# Patient Record
Sex: MALE | Race: WHITE | NOT HISPANIC OR LATINO | Employment: FULL TIME | URBAN - METROPOLITAN AREA
[De-identification: names, ages, dates, MRNs, and addresses within clinical notes are randomized per-mention and may not be internally consistent; named-entity substitution may affect disease eponyms.]

---
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
## 2020-01-02 ENCOUNTER — OFFICE VISIT (OUTPATIENT)
Dept: UROLOGY | Facility: CLINIC | Age: 20
End: 2020-01-02
Payer: COMMERCIAL

## 2020-01-02 VITALS
HEIGHT: 69 IN | DIASTOLIC BLOOD PRESSURE: 61 MMHG | BODY MASS INDEX: 26.96 KG/M2 | WEIGHT: 182 LBS | SYSTOLIC BLOOD PRESSURE: 109 MMHG | HEART RATE: 59 BPM

## 2020-01-02 DIAGNOSIS — N20.0 NEPHROLITHIASIS: Primary | ICD-10-CM

## 2020-01-02 PROCEDURE — 3008F BODY MASS INDEX DOCD: CPT | Mod: CPTII,S$GLB,, | Performed by: UROLOGY

## 2020-01-02 PROCEDURE — 99999 PR PBB SHADOW E&M-EST. PATIENT-LVL III: ICD-10-PCS | Mod: PBBFAC,,, | Performed by: UROLOGY

## 2020-01-02 PROCEDURE — 3008F PR BODY MASS INDEX (BMI) DOCUMENTED: ICD-10-PCS | Mod: CPTII,S$GLB,, | Performed by: UROLOGY

## 2020-01-02 PROCEDURE — 99999 PR PBB SHADOW E&M-EST. PATIENT-LVL III: CPT | Mod: PBBFAC,,, | Performed by: UROLOGY

## 2020-01-02 PROCEDURE — 99215 PR OFFICE/OUTPT VISIT, EST, LEVL V, 40-54 MIN: ICD-10-PCS | Mod: S$GLB,,, | Performed by: UROLOGY

## 2020-01-02 PROCEDURE — 99215 OFFICE O/P EST HI 40 MIN: CPT | Mod: S$GLB,,, | Performed by: UROLOGY

## 2020-01-02 RX ORDER — KETOROLAC TROMETHAMINE 10 MG/1
10 TABLET, FILM COATED ORAL EVERY 8 HOURS PRN
Qty: 10 TABLET | Refills: 0 | Status: SHIPPED | OUTPATIENT
Start: 2020-01-02 | End: 2020-01-07

## 2020-01-02 NOTE — PROGRESS NOTES
[facility] Urology H&P Note - [place]   Staff: MD Milena  Group:Milena/Richard/Junito/William  Referring provider and please cc:    PCP: Rajni Hickey MD        Subjective:      HPI: Juan Jose Wiseman is a 19 y.o. male      Right uvj stone s/p ureteroscopy with hypercalciuria and hyperparahtyroidism  -He went to ER on 5/18/18 for right flank pain 10/10 that had been present for 3-4 days. +nausea and vomiting. No fever. +frequency and urgency. No dysuria and decreased UOP (likely from dehydration). CTAP with contrast showed a 7mm right distal ureteral stone with proximal hydronephrosis. No other stones seen on ct scan.  WBC was 12.7, cr was normal. ALT 49, Calcium 11.3. Ua showed blood and protein, hyaline casts. He was sent home with norco, zofran and flomax.  failed trial of passge  -6/13/18 underwent right urs and stone extraction with stent on strings. Removed stent a few days later without any problems. This was his only and firs stone. Stones were 60% ca phosphate/40%ca phosphate apatite   -I had him see  in [place] for persistent elevated pth (270s then 220ss) and elevated calcium 11.9. He underwent a parathyroidectomy in 12/7/18. F/u pth was normal.   -last seen by me on 7/27/19 and this was his first and only stone. Plan was for f/u prn. He called 9/5 with c/o GH and difficulty urinating. I recommended a kub,rbus and f/u. They did not make f/u due to work and school. Came to er 9/11/19 with c/o nausea/vomiting/hematuria/pain and chills. ctrss in er showed a RUP 6mm stone, R UVJ 8mm stone and bilateral smaller obstructing stones. Underwent extraction of distal right stone and stent placed   -returned on 9/25/19 for extraction of remaining R renal stones (3 stones, RUP 7mm, RLP 3mm, RMP 2mm) they had moved to ureter. Some randalls plaques seen. Removed stent at home.    Interval history:   Called c/o flan pain around 11/12/19. Started him on flomax and toradol. kub and rbus on  "11/13/19 showed no hydro and possible 6mm stone but had also a known 1mm stone in L kidney. ua was negative for blood. I asked him to stay on flomax and toradol, stayed on flomax.  He then went to health food store and picked up "stone free" and took 5x a day and passed about 3 small stone fragments (saw them in toilet) on 11/17/19. Since then no flank pain.     Was supposed to do 24 hour urine but hasn't had time      ua void: could not provide  ua history/ucx:  11/13/19          No cx, void: neg  9/18/19 Ng, void: 3+bld/tr leuk, >100 rbc (stent in)  9/11/19 Ng, orange,  25 rbc/4 wbc   6/12/18            Ng, void: tr blood  6/6/18              Ng, void: tr bld/trketones  5/25/18            Ng, void: tr leuk and blood, cath: 2+blood  5/18/18            No cx, void: large bloodNo family hx of stones.      REVIEW OF SYSTEMS:  General ROS: no fevers, no chills  Psychological ROS: no depression  Endocrine ROS: no heat or cold  Respiratory ROS: no SOB  Cardiovascular ROS: no CP  Gastrointestinal ROS: no abdominal pain, no constipation, no diarrhea, noBRBPR  Musculoskeletal ROS: no muscle pain  Neurological ROS: no headaches  Dermatological ROS: no rashes  HEENT: nonglasses, no sinus   ROS: per HPI     PMHx:  Past Medical History:   Diagnosis Date    Hyperparathyroidism           PSHx:  Past Surgical History:   Procedure Laterality Date    CYSTOSCOPY WITH CALCULUS EXTRACTION Right 9/25/2019    Procedure: CYSTOSCOPY, WITH CALCULUS REMOVAL;  Surgeon: Kayla Abbott MD;  Location: [facility] OR;  Service: Urology;  Laterality: Right;    CYSTOSCOPY WITH URETEROSCOPY, RETROGRADE PYELOGRAPHY, AND INSERTION OF STENT Right 6/13/2018    Procedure: CYSTOSCOPY, WITH RETROGRADE PYELOGRAM AND URETERAL STENT INSERTION;  Surgeon: Kayla Abbott MD;  Location: [facility] OR;  Service: Urology;  Laterality: Right;    CYSTOURETEROSCOPY WITH RETROGRADE PYELOGRAPHY AND INSERTION OF STENT INTO URETER Right 9/11/2019    " Procedure: CYSTOURETEROSCOPY, WITH RETROGRADE PYELOGRAM AND URETERAL STENT INSERTION;  Surgeon: Kayla Abbott MD;  Location: [facility] OR;  Service: Urology;  Laterality: Right;    CYSTOURETEROSCOPY WITH RETROGRADE PYELOGRAPHY AND INSERTION OF STENT INTO URETER Right 9/25/2019    Procedure: CYSTOURETEROSCOPY, WITH RETROGRADE PYELOGRAM AND URETERAL STENT INSERTION [2531];  Surgeon: Kayla Abbott MD;  Location: [facility] OR;  Service: Urology;  Laterality: Right;  flexible urs    head stiches  2009    2006 left pinky stiches    LASER LITHOTRIPSY Right 6/13/2018    Procedure: LITHOTRIPSY, USING LASER;  Surgeon: Kayla Abbott MD;  Location: [facility] OR;  Service: Urology;  Laterality: Right;    LASER LITHOTRIPSY Right 9/11/2019    Procedure: LITHOTRIPSY, USING LASER;  Surgeon: Kayla Abbott MD;  Location: [facility] OR;  Service: Urology;  Laterality: Right;    PARATHYROIDECTOMY N/A 12/7/2018    Procedure: PARATHYROIDECTOMY;  Surgeon: Lakeisha Patricio MD;  Location: [facility];  Service: General;  Laterality: N/A;  NIMS & Intraop PTH monitoring     TONSILLECTOMY      TYMPANOSTOMY TUBE PLACEMENT      URETEROSCOPIC REMOVAL OF URETERIC CALCULUS Right 6/13/2018    Procedure: REMOVAL, CALCULUS, URETER, URETEROSCOPIC;  Surgeon: Kayla Abbott MD;  Location: [facility] OR;  Service: Urology;  Laterality: Right;    URETEROSCOPIC REMOVAL OF URETERIC CALCULUS  9/11/2019    Procedure: REMOVAL, CALCULUS, URETER, URETEROSCOPIC;  Surgeon: Kayla Abbott MD;  Location: [facility];  Service: Urology;;       Stents/Valves/Foreign Bodies: No  Cardiac Evaluation: No     Screening Studies  Colonoscopy: no     Fam Hx:   malignancies: No  . Gyn malignancies: no.   kidney stones: No      Soc Hx:  + tobacco.  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     Allergies:  Shellfish containing products and Iodine and iodide containing products        Anticoagulation/Aspirin: none  Objective:      Vitals:    01/02/20 1433   BP: 109/61   Pulse: (!) 59          General:WDWN in NAD  Eyes: PERRLA, normal conjunctiva  Respiratory: no increased work on breathing. No wheezing.   Cardiovascular: No obvious extremity edema. Warm and well perfused.   GI: no palpation of masses. No tenderness. No hepatosplenomegaly to palpation.  Musculoskeletal: normal range of motion of bilateral upper extremities. Normal muscle strength and tone.  Skin: no obvious rashes or lesions. No tightening of skin noted.  Neurologic: CN grossly normal. Normal sensation.   Psychiatric: awake, alert and oriented x 3. Mood and affect normal. Cooperative.      exam   deferred     LABS REVIEW:  Recent Labs   Lab 06/12/19  1155 09/11/19  1353 09/12/19  0352   WBC 6.86 12.75 H 9.08   Hemoglobin 14.1 14.3 12.6 L   Hematocrit 42.6 43.3 38.8 L   Platelets 250 204 191   ]  Recent Labs   Lab 07/16/18  0852 10/11/18  1428 12/12/18  1036 06/12/19  1155 09/11/19  1353 09/12/19  0352   Sodium 138 140 138 139 138 139   Potassium 4.5 4.1 4.3 4.1 3.9 4.1   Chloride 106 104 104 105 104 106   CO2 26 27 25 22 L 25 25   BUN, Bld 9 8 11 14 9 7   Creatinine 0.8 0.7 0.8 0.8 0.8 0.7   Glucose 96 115 H 89 81 98 118 H   Calcium 11.9 H 12.2 HH 10.1 10.1 9.6 9.0   Phosphorus 2.7 2.0 L 2.6 L  --   --   --    Alkaline Phosphatase 124  --   --  67 59  --    Total Protein 7.4  --   --  7.6 7.7  --    Albumin 4.5 4.3 4.4 4.5 4.8  --    Total Bilirubin 0.8  --   --  0.4 0.6  --    AST 16  --   --  17 17  --    ALT 26  --   --  21 26  --    ]    No results found for: LABA1C, HGBA1C          Pertinent urologic PATHOLOGY REVIEW:  Stones were 60% ca phosphate/40%ca phosphate apatite      Pertinent Urologic RADIOGRAPHIC REVIEW:  rbus 11/13/19  Right kidney: The right kidney measures 10.2 cm. No cortical thinning. No loss of  corticomedullary distinction. Resistive index measures 0.62.  No mass. A couple 6 mm echogenic foci suggesting stones no hydronephrosis.    Left kidney: The left kidney measures 10.7 cm. No cortical thinning. No loss of corticomedullary distinction. Resistive index measures 0.50.  No mass. No renal stone. No hydronephrosis.    The bladder is image but the bladder nearly completely empty at the time of the exam.  Bladder wall does appear diffusely thickened questioning cystitis but the appearance is accentuated and can be due to the incomplete degree of distention.  Ureteral jets not visualized    kub 11/12/19  There is a suspected 2 mm stone of the lower pole of the right kidney.  No stones are seen overlying the left renal shadow or along the courses of the ureters    ctrss 9/11/19  Lung bases clear.  Normal size heart.  Nondistended gallbladder.  There are several stones in the right renal collecting system, at least 3 in number with largest measuring 7 mm.  1 mm stone in the left renal collecting system.  There is hydroureteronephrosis on the right with a 7 mm stone near the UVJ.  Remaining solid abdominal organs grossly unremarkable on this noncontrast exam.  There is no enteric contrast which limits bowel assessment.  No dilated loops of bowel.  Normal appendix.  Unremarkable noncontrast prostate.  Several prominent but nonenlarged mesenteric and retroperitoneal lymph nodes.  No acute osseous abnormality.     rbus 6/11/18  No hydro and possible right renal stone     kub 6/11/18  Nonspecific 5 mm calcification right hemipelvis, for which a distal right ureteral stone cannot be excluded     ctap w 5/18/18 smh  7mm distal stone with proximal hydro   No other stones        Assessment:      Nephrolithiasis         Plan:        Still  May have R renal stones although I had cleared him based on xray and rbus  Had hyperparathyroidism s/p parathyroidectomy in 12/18  Hasn't had repeat labs since except for Ca 9/2019 and  "that was 6.0  Recommend 24 hour urine + repeating a pTH    Keep flomax and toradol on hand and take if passing stone  toradol rewritten and sent to [facility]  Cannot recommend for or against "stone free" supplement     Repeating to make sure not the cause of recurrent stones, should be much less prone to make stones now that he had parahtyroids removed.   rbus and kub in 6 months and f/u to discuss images and 24 hour.   24 hour urine + labs (vit D, pth, bmp)       Kayla Abbott MD   "

## 2020-01-02 NOTE — PATIENT INSTRUCTIONS
"  Still  May have R renal stones although I had cleared him based on xray and rbus  Had hyperparathyroidism s/p parathyroidectomy in 12/18  Hasn't had repeat labs since except for Ca 9/2019 and that was 6.0  Recommend 24 hour urine + repeating a pTH    Keep flomax and toradol on hand and take if passing stone  toradol rewritten and sent to [facility]  Cannot recommend for or against "stone free" supplement     Repeating to make sure not the cause of recurrent stones, should be much less prone to make stones now that he had parahtyroids removed.   rbus and kub in 6 months and f/u to discuss images and 24 hour.   24 hour urine + labs (vit D, pth, bmp)    "

## 2020-03-25 ENCOUNTER — OFFICE VISIT (OUTPATIENT)
Dept: FAMILY MEDICINE | Facility: CLINIC | Age: 20
End: 2020-03-25
Payer: COMMERCIAL

## 2020-03-25 ENCOUNTER — TELEPHONE (OUTPATIENT)
Dept: FAMILY MEDICINE | Facility: CLINIC | Age: 20
End: 2020-03-25

## 2020-03-25 DIAGNOSIS — R05.9 COUGH: ICD-10-CM

## 2020-03-25 DIAGNOSIS — H92.09 EAR ACHE: Primary | ICD-10-CM

## 2020-03-25 DIAGNOSIS — R09.81 CONGESTION OF PARANASAL SINUS: ICD-10-CM

## 2020-03-25 PROCEDURE — 99213 PR OFFICE/OUTPT VISIT, EST, LEVL III, 20-29 MIN: ICD-10-PCS | Mod: 95,,, | Performed by: NURSE PRACTITIONER

## 2020-03-25 PROCEDURE — 99213 OFFICE O/P EST LOW 20 MIN: CPT | Mod: 95,,, | Performed by: NURSE PRACTITIONER

## 2020-03-25 NOTE — TELEPHONE ENCOUNTER
----- Message from Keyonna Bosch sent at 3/25/2020  4:33 PM CDT -----  Contact: Mother Kayla [phone]  Patient;s mother called and asked for a prescription Claritin D or Sudified to be sent into [facility] [address]   Call back [phone]

## 2020-03-25 NOTE — PATIENT INSTRUCTIONS
Continue tylenol and ibuprofen. Stop theraflu  Take claritin d 12 hour twice a day for 5-7 days.  Mucinex DM or Delsym(Dextromethorphan) for cough.  Sudafed if claritin d not available.  Call Friday with update if needed. OOW until Monday. Quarantine until Monday unless fever or still coughing.

## 2020-03-25 NOTE — PROGRESS NOTES
This dictation has been generated using Modal Fluency Dictation some phonetic errors may occur. Please contact author for clarification if needed.     Problem List Items Addressed This Visit     None      Visit Diagnoses     Ear ache    -  Primary    Cough        Congestion of paranasal sinus            Patient Instructions   Continue tylenol and ibuprofen. Stop theraflu  Take claritin d 12 hour twice a day for 5-7 days.  Mucinex DM or Delsym(Dextromethorphan) for cough.  Sudafed if claritin d not available.  Call Friday with update if needed. OOW until Monday. Quarantine until Monday unless fever or still coughing.       Tory teen until Monday.  If afebrile for 72 hr may break quarantine.  Recommended over-the-counter therapies as above.  Suspect eustachian tube dysfunction.  Though strep exposure patient does not have classic symptoms and throat is unremarkable on image.    Follow up if symptoms worsen or fail to improve.    ________________________________________________________________  ________________________________________________________________      Chief Complaint   Patient presents with    Sore Throat     History of present illness  This 20 y.o. presents today for complaint of sore throat which is improved.  He notes cough and congestion.  He has had earache bilateral but worse on the right.  He does have a small child who tested positive for strep however patient notes cough and productive green sputum.  He does not currently have any gland swelling.  No fever or chills.  He tried TheraFlu for his symptoms without resolution.  No known covid contact.   Review of systems  Patient does no body aches.  No malaise.  No sinus pain.  No drainage from the eyes.  Cough productive of sputum.  Patient does note shortness of breath.  No chest pain.  Patient is short of breath with coughing.  Allergies iodine  Surgery history tonsil and adenoids.  Had tubes in his ears all around the age of 4 years of  age        Past Medical History:   Diagnosis Date    Hyperparathyroidism        Past Surgical History:   Procedure Laterality Date    CYSTOSCOPY WITH CALCULUS EXTRACTION Right 9/25/2019    Procedure: CYSTOSCOPY, WITH CALCULUS REMOVAL;  Surgeon: Kayla Abbott MD;  Location: [facility];  Service: Urology;  Laterality: Right;    CYSTOSCOPY WITH URETEROSCOPY, RETROGRADE PYELOGRAPHY, AND INSERTION OF STENT Right 6/13/2018    Procedure: CYSTOSCOPY, WITH RETROGRADE PYELOGRAM AND URETERAL STENT INSERTION;  Surgeon: Kayla Abbott MD;  Location: [facility];  Service: Urology;  Laterality: Right;    CYSTOURETEROSCOPY WITH RETROGRADE PYELOGRAPHY AND INSERTION OF STENT INTO URETER Right 9/11/2019    Procedure: CYSTOURETEROSCOPY, WITH RETROGRADE PYELOGRAM AND URETERAL STENT INSERTION;  Surgeon: Kayla Abbott MD;  Location: [facility];  Service: Urology;  Laterality: Right;    CYSTOURETEROSCOPY WITH RETROGRADE PYELOGRAPHY AND INSERTION OF STENT INTO URETER Right 9/25/2019    Procedure: CYSTOURETEROSCOPY, WITH RETROGRADE PYELOGRAM AND URETERAL STENT INSERTION [2531];  Surgeon: Kayla Abbott MD;  Location: [facility];  Service: Urology;  Laterality: Right;  flexible urs    head stiches  2009 2006 left pinky stiches    LASER LITHOTRIPSY Right 6/13/2018    Procedure: LITHOTRIPSY, USING LASER;  Surgeon: Kayla Abbott MD;  Location: [facility];  Service: Urology;  Laterality: Right;    LASER LITHOTRIPSY Right 9/11/2019    Procedure: LITHOTRIPSY, USING LASER;  Surgeon: Kayla Abbott MD;  Location: [facility];  Service: Urology;  Laterality: Right;    PARATHYROIDECTOMY N/A 12/7/2018    Procedure: PARATHYROIDECTOMY;  Surgeon: Lakeisha Patricio MD;  Location: [facility];  Service: General;  Laterality: N/A;  NIMS & Intraop PTH monitoring     TONSILLECTOMY      TYMPANOSTOMY TUBE PLACEMENT      URETEROSCOPIC REMOVAL OF URETERIC CALCULUS Right 6/13/2018    Procedure: REMOVAL,  CALCULUS, URETER, URETEROSCOPIC;  Surgeon: Kayla Abbott MD;  Location: [facility] OR;  Service: Urology;  Laterality: Right;    URETEROSCOPIC REMOVAL OF URETERIC CALCULUS  9/11/2019    Procedure: REMOVAL, CALCULUS, URETER, URETEROSCOPIC;  Surgeon: Kayla Abbott MD;  Location: [facility] OR;  Service: Urology;;       Family History   Problem Relation Age of Onset    Stroke Maternal Aunt     Heart disease Maternal Grandmother     Diabetes Maternal Grandmother     Hypertension Maternal Grandmother     Cancer Maternal Grandmother     Lupus Maternal Grandfather     Heart disease Maternal Grandfather     Hypertension Maternal Grandfather     Heart disease Paternal Grandmother     Hypertension Paternal Grandmother     Cancer Paternal Grandmother     Heart disease Paternal Grandfather     Hypertension Paternal Grandfather     Cancer Paternal Grandfather     Psoriasis Neg Hx     Eczema Neg Hx        Social History     Socioeconomic History    Marital status: Single     Spouse name: Not on file    Number of children: Not on file    Years of education: Not on file    Highest education level: Not on file   Occupational History    Not on file   Social Needs    Financial resource strain: Not on file    Food insecurity:     Worry: Not on file     Inability: Not on file    Transportation needs:     Medical: No     Non-medical: No   Tobacco Use    Smoking status: Current Every Day Smoker     Packs/day: 0.50     Types: Cigarettes    Smokeless tobacco: Never Used   Substance and Sexual Activity    Alcohol use: No     Frequency: 2-4 times a month     Drinks per session: 1 or 2     Binge frequency: Never    Drug use: No    Sexual activity: Not on file   Lifestyle    Physical activity:     Days per week: 5 days     Minutes per session: 60 min    Stress: Not at all   Relationships    Social connections:     Talks on phone: More than three times a week     Gets together: More than three times a  week     Attends Christianity service: Not on file     Active member of club or organization: No     Attends meetings of clubs or organizations: Never     Relationship status: Never    Other Topics Concern    Not on file   Social History Narrative    [employer] student studying to be a . Graduates May 2020.  His girlfriend is pregnant.       Current Outpatient Medications   Medication Sig Dispense Refill    calcium carbonate 400 mg calcium (1,000 mg) Chew Take 1000mg elemental calcium twice daily. (Patient not taking: Reported on 1/2/2020)  0    cholecalciferol, vitamin D3, (VITAMIN D3) 1,000 unit capsule Take 2 capsules (2,000 Units total) by mouth once daily. (Patient not taking: Reported on 1/2/2020)  0    ondansetron (ZOFRAN) 4 MG tablet Take 1 tablet (4 mg total) by mouth every 6 (six) hours as needed for Nausea. 30 tablet 0    pediatric multivitamin chewable tablet Take 1 tablet by mouth once daily.      senna-docusate 8.6-50 mg (PERICOLACE) 8.6-50 mg per tablet Take 1 tablet by mouth 2 (two) times daily. (Patient not taking: Reported on 1/2/2020) 10 tablet 0    tamsulosin (FLOMAX) 0.4 mg Cap Take 1 capsule (0.4 mg total) by mouth after dinner. 30 capsule 0     No current facility-administered medications for this visit.      Facility-Administered Medications Ordered in Other Visits   Medication Dose Route Frequency Provider Last Rate Last Dose    cefTRIAXone (ROCEPHIN) 2 g in dextrose 5 % 50 mL IVPB  2 g Intravenous On Call Procedure Kayla Abbott MD   2 g at 09/25/19 0735       Review of patient's allergies indicates:   Allergen Reactions    Shellfish containing products Nausea And Vomiting    Iodine and iodide containing products        Physical examination  Vitals Reviewed  Gen. Well-dressed well-nourished patient looks sick not septic  Skin warm dry and intact.  No rashes noted.  HEENT.    Pharynx is unremarkable.  Image noted on phone.  No maxillary or frontal sinus tenderness  when percussed.    Neck is supple without adenopathy  Chest.  Respirations are even unlabored.   Neuro. Awake alert oriented x4.  Normal judgment and cognition noted.  Extremities no clubbing cyanosis or edema noted.     Call or return to clinic prn if these symptoms worsen or fail to improve as anticipated.

## 2020-03-26 ENCOUNTER — PATIENT MESSAGE (OUTPATIENT)
Dept: FAMILY MEDICINE | Facility: CLINIC | Age: 20
End: 2020-03-26

## 2020-03-26 DIAGNOSIS — R09.81 SINUS CONGESTION: Primary | ICD-10-CM

## 2020-06-12 ENCOUNTER — OFFICE VISIT (OUTPATIENT)
Dept: FAMILY MEDICINE | Facility: CLINIC | Age: 20
End: 2020-06-12
Payer: COMMERCIAL

## 2020-06-12 VITALS
BODY MASS INDEX: 26.35 KG/M2 | HEART RATE: 68 BPM | HEIGHT: 69 IN | OXYGEN SATURATION: 97 % | DIASTOLIC BLOOD PRESSURE: 62 MMHG | TEMPERATURE: 98 F | WEIGHT: 177.94 LBS | SYSTOLIC BLOOD PRESSURE: 104 MMHG

## 2020-06-12 DIAGNOSIS — Z13.6 ENCOUNTER FOR LIPID SCREENING FOR CARDIOVASCULAR DISEASE: ICD-10-CM

## 2020-06-12 DIAGNOSIS — Z00.00 WELLNESS EXAMINATION: Primary | ICD-10-CM

## 2020-06-12 DIAGNOSIS — Z86.39 HISTORY OF PARATHYROID DISEASE: ICD-10-CM

## 2020-06-12 DIAGNOSIS — Z13.220 ENCOUNTER FOR LIPID SCREENING FOR CARDIOVASCULAR DISEASE: ICD-10-CM

## 2020-06-12 PROCEDURE — 99999 PR PBB SHADOW E&M-EST. PATIENT-LVL IV: ICD-10-PCS | Mod: PBBFAC,,, | Performed by: FAMILY MEDICINE

## 2020-06-12 PROCEDURE — 99395 PR PREVENTIVE VISIT,EST,18-39: ICD-10-PCS | Mod: S$GLB,,, | Performed by: FAMILY MEDICINE

## 2020-06-12 PROCEDURE — 99999 PR PBB SHADOW E&M-EST. PATIENT-LVL IV: CPT | Mod: PBBFAC,,, | Performed by: FAMILY MEDICINE

## 2020-06-12 PROCEDURE — 99395 PREV VISIT EST AGE 18-39: CPT | Mod: S$GLB,,, | Performed by: FAMILY MEDICINE

## 2020-06-12 NOTE — PROGRESS NOTES
Subjective:       Patient ID: Juan Jose Wiseman is a 20 y.o. male.    Chief Complaint: Annual Exam    HPI     Mr. Wiseman presents to clinic for wellness.     History of hyperparathyroidism. Symptoms have resolved. Patient states he was signed off by endocrinology.    Seeing Dr. Abbott for kidney stones.     Diet: Eat what he wants    Exercsie: Does a lot of yard work.       Past Medical History:   Diagnosis Date    Hyperparathyroidism        Past Surgical History:   Procedure Laterality Date    CYSTOSCOPY WITH CALCULUS EXTRACTION Right 9/25/2019    Procedure: CYSTOSCOPY, WITH CALCULUS REMOVAL;  Surgeon: Kayla Abbott MD;  Location: [facility];  Service: Urology;  Laterality: Right;    CYSTOSCOPY WITH URETEROSCOPY, RETROGRADE PYELOGRAPHY, AND INSERTION OF STENT Right 6/13/2018    Procedure: CYSTOSCOPY, WITH RETROGRADE PYELOGRAM AND URETERAL STENT INSERTION;  Surgeon: Kayla Abbott MD;  Location: [facility];  Service: Urology;  Laterality: Right;    CYSTOURETEROSCOPY WITH RETROGRADE PYELOGRAPHY AND INSERTION OF STENT INTO URETER Right 9/11/2019    Procedure: CYSTOURETEROSCOPY, WITH RETROGRADE PYELOGRAM AND URETERAL STENT INSERTION;  Surgeon: Kayla Abbott MD;  Location: [facility] OR;  Service: Urology;  Laterality: Right;    CYSTOURETEROSCOPY WITH RETROGRADE PYELOGRAPHY AND INSERTION OF STENT INTO URETER Right 9/25/2019    Procedure: CYSTOURETEROSCOPY, WITH RETROGRADE PYELOGRAM AND URETERAL STENT INSERTION [2531];  Surgeon: Kayla Abbott MD;  Location: [facility] OR;  Service: Urology;  Laterality: Right;  flexible urs    head stiches  2009 2006 left pinky stiches    LASER LITHOTRIPSY Right 6/13/2018    Procedure: LITHOTRIPSY, USING LASER;  Surgeon: Kayla Abbott MD;  Location: [facility] OR;  Service: Urology;  Laterality: Right;    LASER LITHOTRIPSY Right 9/11/2019    Procedure: LITHOTRIPSY, USING LASER;  Surgeon: Kayla Abbott MD;  Location: [facility] OR;  Service:  Urology;  Laterality: Right;    PARATHYROIDECTOMY N/A 12/7/2018    Procedure: PARATHYROIDECTOMY;  Surgeon: Lakeisha Patricio MD;  Location: [facility];  Service: General;  Laterality: N/A;  NIMS & Intraop PTH monitoring     TONSILLECTOMY      TYMPANOSTOMY TUBE PLACEMENT      URETEROSCOPIC REMOVAL OF URETERIC CALCULUS Right 6/13/2018    Procedure: REMOVAL, CALCULUS, URETER, URETEROSCOPIC;  Surgeon: Kayla Abbott MD;  Location: [facility] OR;  Service: Urology;  Laterality: Right;    URETEROSCOPIC REMOVAL OF URETERIC CALCULUS  9/11/2019    Procedure: REMOVAL, CALCULUS, URETER, URETEROSCOPIC;  Surgeon: Kayla Abbott MD;  Location: [facility] OR;  Service: Urology;;       Family History   Problem Relation Age of Onset    Stroke Maternal Aunt     Heart disease Maternal Grandmother     Diabetes Maternal Grandmother     Hypertension Maternal Grandmother     Cancer Maternal Grandmother     Lupus Maternal Grandfather     Heart disease Maternal Grandfather     Hypertension Maternal Grandfather     Heart disease Paternal Grandmother     Hypertension Paternal Grandmother     Cancer Paternal Grandmother     Heart disease Paternal Grandfather     Hypertension Paternal Grandfather     Cancer Paternal Grandfather     Psoriasis Neg Hx     Eczema Neg Hx        Social History     Tobacco Use    Smoking status: Current Every Day Smoker     Packs/day: 0.50     Types: Cigarettes    Smokeless tobacco: Never Used   Substance Use Topics    Alcohol use: No     Frequency: 2-4 times a month     Drinks per session: 1 or 2     Binge frequency: Never    Drug use: No       Social History     Substance and Sexual Activity   Sexual Activity Not on file          Current Outpatient Medications:     calcium carbonate 400 mg calcium (1,000 mg) Chew, Take 1000mg elemental calcium twice daily. (Patient not taking: Reported on 6/12/2020), Disp: , Rfl: 0    cholecalciferol, vitamin D3, (VITAMIN D3) 1,000 unit capsule,  Take 2 capsules (2,000 Units total) by mouth once daily. (Patient not taking: Reported on 1/2/2020), Disp: , Rfl: 0    ondansetron (ZOFRAN) 4 MG tablet, Take 1 tablet (4 mg total) by mouth every 6 (six) hours as needed for Nausea., Disp: 30 tablet, Rfl: 0    pediatric multivitamin chewable tablet, Take 1 tablet by mouth once daily., Disp: , Rfl:     senna-docusate 8.6-50 mg (PERICOLACE) 8.6-50 mg per tablet, Take 1 tablet by mouth 2 (two) times daily. (Patient not taking: Reported on 1/2/2020), Disp: 10 tablet, Rfl: 0    tamsulosin (FLOMAX) 0.4 mg Cap, Take 1 capsule (0.4 mg total) by mouth after dinner., Disp: 30 capsule, Rfl: 0  No current facility-administered medications for this visit.     Facility-Administered Medications Ordered in Other Visits:     cefTRIAXone (ROCEPHIN) 2 g in dextrose 5 % 50 mL IVPB, 2 g, Intravenous, On Call Procedure, Kayla Abbott MD, 2 g at 09/25/19 0735     Review of patient's allergies indicates:   Allergen Reactions    Shellfish containing products Nausea And Vomiting    Iodine and iodide containing products         Single    Review of Systems   Constitutional: Negative for activity change and unexpected weight change.   HENT: Negative for hearing loss, rhinorrhea and trouble swallowing.    Eyes: Negative for discharge and visual disturbance.   Respiratory: Negative for chest tightness and wheezing.    Cardiovascular: Negative for chest pain and palpitations.   Gastrointestinal: Negative for blood in stool, constipation, diarrhea and vomiting.   Endocrine: Negative for polydipsia and polyuria.   Genitourinary: Negative for difficulty urinating, hematuria and urgency.   Musculoskeletal: Negative for arthralgias, joint swelling and neck pain.   Neurological: Negative for weakness and headaches.   Psychiatric/Behavioral: Negative for confusion and dysphoric mood.           Objective:          Vitals:    06/12/20 0907   BP: 104/62   Pulse: 68   Temp: 97.6 °F (36.4 °C)  "  SpO2: 97%   Weight: 80.7 kg (177 lb 14.6 oz)   Height: 5' 9" (1.753 m)       Physical Exam  Vitals signs reviewed.   Constitutional:       General: He is not in acute distress.     Appearance: Normal appearance. He is well-developed.   HENT:      Head: Normocephalic and atraumatic.      Right Ear: Hearing and external ear normal.      Left Ear: Hearing and external ear normal.   Eyes:      General: Lids are normal.      Conjunctiva/sclera: Conjunctivae normal.   Cardiovascular:      Rate and Rhythm: Normal rate and regular rhythm.      Pulses: Normal pulses.      Heart sounds: Normal heart sounds.   Pulmonary:      Effort: Pulmonary effort is normal.      Breath sounds: Normal breath sounds.   Abdominal:      General: Bowel sounds are normal.      Palpations: Abdomen is soft.      Tenderness: There is no abdominal tenderness.   Musculoskeletal: Normal range of motion.   Skin:     General: Skin is warm.      Findings: No rash.   Neurological:      General: No focal deficit present.      Mental Status: He is alert.   Psychiatric:         Speech: Speech normal.         Behavior: Behavior normal. Behavior is cooperative.                 Assessment/Plan     Juan Jose was seen today for annual exam.    Diagnoses and all orders for this visit:    Wellness examination  -     Comprehensive metabolic panel; Future  -     CBC auto differential; Future  -     URINALYSIS; Future    Encounter for lipid screening for cardiovascular disease  -     Lipid Panel; Future    History of parathyroid disease  -     PTH, intact; Future  -     Vitamin D; Future          Follow up in about 1 year (around 6/12/2021) for wellness.    Future Appointments   Date Time Provider Department Center   7/6/2020  1:00 PM [facility] US1 [facility] ULSOUND [facility]   7/6/2020  1:45 PM [facility] XR2 [facility] XRAY [facility]   7/9/2020  1:00 PM Kayla Abbott MD [facility] UROLOGY [facility]         Rajni Hickey MD  [facility]       "

## 2020-06-12 NOTE — PATIENT INSTRUCTIONS
4 Steps for Eating Healthier  Changing the way you eat can improve your health. It can lower your cholesterol and blood pressure, and help you stay at a healthy weight. Your diet doesnt have to be bland and boring to be healthy. Just watch your calories and follow these steps:    1. Eat fewer unhealthy fats  · Choose more fish and lean meats instead of fatty cuts of meat.  · Skip butter and lard, and use less margarine.  · Pass on foods that have palm, coconut, or hydrogenated oils.  · Eat fewer high-fat dairy foods like cheese, ice cream, and whole milk.  · Get a heart-healthy cookbook and try some low-fat recipes.  2. Go light on salt  · Keep the saltshaker off the table.  · Limit high-salt ingredients, such as soy sauce, bouillon, and garlic salt.  · Instead of adding salt when cooking, season your food with herbs and flavorings. Try lemon, garlic, and onion.  · Limit convenience foods, such as boxed or canned foods and restaurant food.  · Read food labels and choose lower-sodium options.  3. Limit sugar  · Pause before you add sugars to pancakes, cereal, coffee, or tea. This includes white and brown table sugar, syrup, honey, and molasses. Cut your usual amount by half.  · Use non-sugar sweeteners. Stevia, aspartame, and sucralose can satisfy a sweet tooth without adding calories.  · Swap out sugar-filled soda and other drinks. Buy sugar-free or low-calorie beverages. Remember water is always the best choice.  · Read labels and choose foods with less added sugar. Keep in mind that dairy foods and foods with fruit will have some natural sugar.  · Cut the sugar in recipes by 1/3 to 1/2. Boost the flavor with extracts like almond, vanilla, or orange. Or add spices such as cinnamon or nutmeg.  4. Eat more fiber  · Eat fresh fruits and vegetables every day.  · Boost your diet with whole grains. Go for oats, whole-grain rice, and bran.  · Add beans and lentils to your meals.  · Drink more water to match your fiber  increase. This is to help prevent constipation.  Date Last Reviewed: 5/11/2015  © 3485-2884 [employer]. [address] [place] [address], [place] [address]. All rights reserved. This information is not intended as a substitute for professional medical care. Always follow your healthcare professional's instructions.

## 2020-07-06 ENCOUNTER — HOSPITAL ENCOUNTER (OUTPATIENT)
Dept: RADIOLOGY | Facility: HOSPITAL | Age: 20
Discharge: HOME | End: 2020-07-06
Attending: UROLOGY
Payer: COMMERCIAL

## 2020-07-06 DIAGNOSIS — N20.0 NEPHROLITHIASIS: ICD-10-CM

## 2020-07-06 PROCEDURE — 74018 RADEX ABDOMEN 1 VIEW: CPT | Mod: TC,FY

## 2020-07-06 PROCEDURE — 74018 RADEX ABDOMEN 1 VIEW: CPT | Mod: 26,,, | Performed by: RADIOLOGY

## 2020-07-06 PROCEDURE — 76770 US EXAM ABDO BACK WALL COMP: CPT | Mod: TC

## 2020-07-06 PROCEDURE — 74018 XR ABDOMEN AP 1 VIEW: ICD-10-PCS | Mod: 26,,, | Performed by: RADIOLOGY

## 2020-07-06 PROCEDURE — 76770 US EXAM ABDO BACK WALL COMP: CPT | Mod: 26,,, | Performed by: RADIOLOGY

## 2020-07-06 PROCEDURE — 76770 US RETROPERITONEAL COMPLETE: ICD-10-PCS | Mod: 26,,, | Performed by: RADIOLOGY

## 2020-07-07 ENCOUNTER — PATIENT OUTREACH (OUTPATIENT)
Dept: ADMINISTRATIVE | Facility: OTHER | Age: 20
End: 2020-07-07

## 2020-07-07 NOTE — PROGRESS NOTES
Chart was reviewed for overdue [facility] Encounters ([facility]AT[facility])  topics  Updates were requested from care everywhere

## 2020-07-09 ENCOUNTER — OFFICE VISIT (OUTPATIENT)
Dept: UROLOGY | Facility: CLINIC | Age: 20
End: 2020-07-09
Payer: COMMERCIAL

## 2020-07-09 VITALS — HEIGHT: 69 IN | BODY MASS INDEX: 25.08 KG/M2 | TEMPERATURE: 98 F | WEIGHT: 169.31 LBS

## 2020-07-09 DIAGNOSIS — R82.994 HYPERCALCIURIA: ICD-10-CM

## 2020-07-09 DIAGNOSIS — N20.0 NEPHROLITHIASIS: Primary | ICD-10-CM

## 2020-07-09 DIAGNOSIS — E21.3 HYPERPARATHYROIDISM: ICD-10-CM

## 2020-07-09 PROBLEM — N39.0 UTI (URINARY TRACT INFECTION): Status: RESOLVED | Noted: 2019-09-11 | Resolved: 2020-07-09

## 2020-07-09 PROBLEM — N13.2 URETERAL STONE WITH HYDRONEPHROSIS: Status: RESOLVED | Noted: 2019-09-11 | Resolved: 2020-07-09

## 2020-07-09 PROCEDURE — 3008F BODY MASS INDEX DOCD: CPT | Mod: CPTII,S$GLB,, | Performed by: UROLOGY

## 2020-07-09 PROCEDURE — 99999 PR PBB SHADOW E&M-EST. PATIENT-LVL IV: CPT | Mod: PBBFAC,,, | Performed by: UROLOGY

## 2020-07-09 PROCEDURE — 3008F PR BODY MASS INDEX (BMI) DOCUMENTED: ICD-10-PCS | Mod: CPTII,S$GLB,, | Performed by: UROLOGY

## 2020-07-09 PROCEDURE — 99214 OFFICE O/P EST MOD 30 MIN: CPT | Mod: S$GLB,,, | Performed by: UROLOGY

## 2020-07-09 PROCEDURE — 99214 PR OFFICE/OUTPT VISIT, EST, LEVL IV, 30-39 MIN: ICD-10-PCS | Mod: S$GLB,,, | Performed by: UROLOGY

## 2020-07-09 PROCEDURE — 99999 PR PBB SHADOW E&M-EST. PATIENT-LVL IV: ICD-10-PCS | Mod: PBBFAC,,, | Performed by: UROLOGY

## 2020-07-09 NOTE — PATIENT INSTRUCTIONS
"Had hyperparathyroidism s/p parathyroidectomy in 12/18  24 hour urine 1/21/20 shows 389 calcium level   Last bmp ca was 9.0 in 9/2019  Last pth 12/2018 30 (prior to surgery 170)  Last stone was 80% ca phosphate 9/2019  Repeat kd ultrasound and xray show small stone sin both kidneys     I was going to order repeat pth, bmp, vit D(which i ordered last time but he didn't have done). Will see if dr carvalho wants any labs ordered .another 24 hour urine calcium ordere? [facility] or through [facility].      If his pth repeat is normal will defer to nephrology, start hctz  or send back to , could just be elevated calcium due to diet if pth remains low.  If his pth is high will def send back to       Keep flomax and toradol on hand and take if passing stone  Cannot recommend for or against "stone free" supplement         Pt will hold off on having labs done until we hear back from   If he hasn't heard from usin 2 weeks he will notify us      rbus and kub in 6 months, will continue to monitor stones and f/u after no matter   "

## 2020-07-09 NOTE — PROGRESS NOTES
[facility] Progress Note - [place]   Staff: MD Milena  Group:Milena/Richard/Junito/William  Referring provider and please cc:    PCP: Rajni Hickey MD        Subjective:      HPI: Juan Jose Wiseman is a 19 y.o. male      Right uvj stone s/p ureteroscopy with hypercalciuria and hyperparahtyroidism  -He went to ER on 5/18/18 for right flank pain 10/10 that had been present for 3-4 days. +nausea and vomiting. No fever. +frequency and urgency. No dysuria and decreased UOP (likely from dehydration). CTAP with contrast showed a 7mm right distal ureteral stone with proximal hydronephrosis. No other stones seen on ct scan.  WBC was 12.7, cr was normal. ALT 49, Calcium 11.3. Ua showed blood and protein, hyaline casts. He was sent home with norco, zofran and flomax.  failed trial of passge  -6/13/18 underwent right urs and stone extraction with stent on strings. Removed stent a few days later without any problems. This was his only and firs stone. Stones were 60% ca phosphate/40%ca phosphate apatite   -I had him see  in [place] for persistent elevated pth (270s then 220ss) and elevated calcium 11.9. He underwent a parathyroidectomy in 12/7/18. F/u pth was normal.   -last seen by me on 7/27/19 and this was his first and only stone. Plan was for f/u prn. He called 9/5 with c/o GH and difficulty urinating. I recommended a kub,rbus and f/u. They did not make f/u due to work and school. Came to er 9/11/19 with c/o nausea/vomiting/hematuria/pain and chills. ctrss in er showed a RUP 6mm stone, R UVJ 8mm stone and bilateral smaller obstructing stones. Underwent extraction of distal right stone and stent placed. Stone was 80% calcium phosphate.   -returned on 9/25/19 for extraction of remaining R renal stones (3 stones, RUP 7mm, RLP 3mm, RMP 2mm) they had moved to ureter. Some randalls plaques seen. Removed stent at home.    Interval history 1/2/20:   Called c/o flan pain around 11/12/19.  "Started him on flomax and toradol. kub and rbus on 11/13/19 showed no hydro and possible 6mm stone but had also a known 1mm stone in L kidney. ua was negative for blood. I asked him to stay on flomax and toradol, stayed on flomax.  He then went to health food store and picked up "stone free" and took 5x a day and passed about 3 small stone fragments (saw them in toilet) on 11/17/19. Since then no flank pain.    Interval history 7/9/20:     kub 7/6/20: LLP stone. rbus 7/6/20: 3 right renal stones (3mm).  2 small left renal stones (4,5mm)    24 Hour Urine Results From [facility]  Date: 1/21/20    Urine volume (0.5-4L):1.50  SS CaOx (6-10): 8.22  Urine Calcium (mg/day) (male <250, female <200): 389  Urine Oxalate (mg/day) (20-40): 25  Urine Citrate (mg/day)(male>450, female>550): 487  SS CaP (0.5-2): 2.34  24 Hour Urine pH (5.8-6.2):6.078  SS Uric Acid (0-1): 0.86  Urine Uric Acid (g/day) (male<0.800, female<0.750): 0.758  Creatinine: 1718       ua void:   ua history/ucx:  11/13/19          No cx, void: neg  9/18/19 Ng, void: 3+bld/tr leuk, >100 rbc (stent in)  9/11/19 Ng, orange,  25 rbc/4 wbc   6/12/18            Ng, void: tr blood  6/6/18              Ng, void: tr bld/trketones  5/25/18            Ng, void: tr leuk and blood, cath: 2+blood  5/18/18            No cx, void: large bloodNo family hx of stones.      REVIEW OF SYSTEMS:  General ROS: no fevers, no chills  Psychological ROS: no depression  Endocrine ROS: no heat or cold  Respiratory ROS: no SOB  Cardiovascular ROS: no CP  Gastrointestinal ROS: no abdominal pain, no constipation, no diarrhea, noBRBPR  Musculoskeletal ROS: no muscle pain  Neurological ROS: no headaches  Dermatological ROS: no rashes  HEENT: nonglasses, no sinus   ROS: per HPI     PMHx:  Past Medical History:   Diagnosis Date    Hyperparathyroidism           PSHx:  Past Surgical History:   Procedure Laterality Date    CYSTOSCOPY WITH CALCULUS EXTRACTION Right 9/25/2019    Procedure: " CYSTOSCOPY, WITH CALCULUS REMOVAL;  Surgeon: Kayla Abbott MD;  Location: [facility] OR;  Service: Urology;  Laterality: Right;    CYSTOSCOPY WITH URETEROSCOPY, RETROGRADE PYELOGRAPHY, AND INSERTION OF STENT Right 6/13/2018    Procedure: CYSTOSCOPY, WITH RETROGRADE PYELOGRAM AND URETERAL STENT INSERTION;  Surgeon: Kayla Abbott MD;  Location: [facility] OR;  Service: Urology;  Laterality: Right;    CYSTOURETEROSCOPY WITH RETROGRADE PYELOGRAPHY AND INSERTION OF STENT INTO URETER Right 9/11/2019    Procedure: CYSTOURETEROSCOPY, WITH RETROGRADE PYELOGRAM AND URETERAL STENT INSERTION;  Surgeon: Kayla Abbott MD;  Location: [facility] OR;  Service: Urology;  Laterality: Right;    CYSTOURETEROSCOPY WITH RETROGRADE PYELOGRAPHY AND INSERTION OF STENT INTO URETER Right 9/25/2019    Procedure: CYSTOURETEROSCOPY, WITH RETROGRADE PYELOGRAM AND URETERAL STENT INSERTION [2531];  Surgeon: Kayla Abbott MD;  Location: [facility] OR;  Service: Urology;  Laterality: Right;  flexible urs    head stiches  2009    2006 left pinky stiches    LASER LITHOTRIPSY Right 6/13/2018    Procedure: LITHOTRIPSY, USING LASER;  Surgeon: Kayla Abbott MD;  Location: [facility] OR;  Service: Urology;  Laterality: Right;    LASER LITHOTRIPSY Right 9/11/2019    Procedure: LITHOTRIPSY, USING LASER;  Surgeon: Kayla Abbott MD;  Location: [facility] OR;  Service: Urology;  Laterality: Right;    PARATHYROIDECTOMY N/A 12/7/2018    Procedure: PARATHYROIDECTOMY;  Surgeon: Lakeisha Patricio MD;  Location: [facility];  Service: General;  Laterality: N/A;  NIMS & Intraop PTH monitoring     TONSILLECTOMY      TYMPANOSTOMY TUBE PLACEMENT      URETEROSCOPIC REMOVAL OF URETERIC CALCULUS Right 6/13/2018    Procedure: REMOVAL, CALCULUS, URETER, URETEROSCOPIC;  Surgeon: Kayla Abbott MD;  Location: [facility];  Service: Urology;  Laterality: Right;    URETEROSCOPIC REMOVAL OF URETERIC CALCULUS  9/11/2019    Procedure:  REMOVAL, CALCULUS, URETER, URETEROSCOPIC;  Surgeon: Kayla Abbott MD;  Location: [facility] OR;  Service: Urology;;       Stents/Valves/Foreign Bodies: No  Cardiac Evaluation: No     Screening Studies  Colonoscopy: no     Fam Hx:   malignancies: No  . Gyn malignancies: no.   kidney stones: No      Soc Hx:  + tobacco.  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 No wheezing.   Cardiovascular: No obvious extremity edema. Warm and well perfused.   GI: no palpation of masses. No tenderness. No hepatosplenomegaly to palpation.  Musculoskeletal: normal range of motion of bilateral upper extremities. Normal muscle strength and tone.  Skin: no obvious rashes or lesions. No tightening of skin noted.  Neurologic: CN grossly normal. Normal sensation.   Psychiatric: awake, alert and oriented x 3. Mood and affect normal. Cooperative.      exam   deferred     LABS REVIEW:  Recent Labs   Lab 06/12/19  1155 09/11/19  1353 09/12/19  0352   WBC 6.86 12.75 H 9.08   Hemoglobin 14.1 14.3 12.6 L   Hematocrit 42.6 43.3 38.8 L   Platelets 250 204 191   ]  Recent Labs   Lab 07/16/18  0852 10/11/18  1428 12/12/18  1036 06/12/19  1155 09/11/19  1353 09/12/19  0352   Sodium 138 140 138 139 138 139   Potassium 4.5 4.1 4.3 4.1 3.9 4.1   Chloride 106 104 104 105 104 106   CO2 26 27 25 22 L 25 25   BUN, Bld 9 8 11 14 9 7   Creatinine 0.8 0.7 0.8 0.8 0.8 0.7   Glucose 96 115 H 89 81 98 118 H   Calcium 11.9 H 12.2 HH 10.1 10.1 9.6 9.0   Phosphorus 2.7 2.0 L 2.6 L  --   --   --    Alkaline  Phosphatase 124  --   --  67 59  --    Total Protein 7.4  --   --  7.6 7.7  --    Albumin 4.5 4.3 4.4 4.5 4.8  --    Total Bilirubin 0.8  --   --  0.4 0.6  --    AST 16  --   --  17 17  --    ALT 26  --   --  21 26  --    ]    No results found for: LABA1C, HGBA1C          Pertinent urologic PATHOLOGY REVIEW:  Stones were 60% ca phosphate/40%ca phosphate apatite      Pertinent Urologic RADIOGRAPHIC REVIEW:  rbus 11/13/19  Right kidney: The right kidney measures 10.2 cm. No cortical thinning. No loss of corticomedullary distinction. Resistive index measures 0.62.  No mass. A couple 6 mm echogenic foci suggesting stones no hydronephrosis.    Left kidney: The left kidney measures 10.7 cm. No cortical thinning. No loss of corticomedullary distinction. Resistive index measures 0.50.  No mass. No renal stone. No hydronephrosis.    The bladder is image but the bladder nearly completely empty at the time of the exam.  Bladder wall does appear diffusely thickened questioning cystitis but the appearance is accentuated and can be due to the incomplete degree of distention.  Ureteral jets not visualized    kub 11/12/19  There is a suspected 2 mm stone of the lower pole of the right kidney.  No stones are seen overlying the left renal shadow or along the courses of the ureters    ctrss 9/11/19  Lung bases clear.  Normal size heart.  Nondistended gallbladder.  There are several stones in the right renal collecting system, at least 3 in number with largest measuring 7 mm.  1 mm stone in the left renal collecting system.  There is hydroureteronephrosis on the right with a 7 mm stone near the UVJ.  Remaining solid abdominal organs grossly unremarkable on this noncontrast exam.  There is no enteric contrast which limits bowel assessment.  No dilated loops of bowel.  Normal appendix.  Unremarkable noncontrast prostate.  Several prominent but nonenlarged mesenteric and retroperitoneal lymph nodes.  No acute osseous abnormality.     rbus  "6/11/18  No hydro and possible right renal stone     kub 6/11/18  Nonspecific 5 mm calcification right hemipelvis, for which a distal right ureteral stone cannot be excluded     ctap w 5/18/18 [facility]  7mm distal stone with proximal hydro   No other stones        Assessment:      Nephrolithiasis  -     PTH, intact; Future; Expected date: 07/09/2020  -     Basic metabolic panel; Future; Expected date: 07/09/2020  -     Vitamin D; Future; Expected date: 07/09/2020  -     X-Ray Abdomen AP 1 View; Future; Expected date: 01/09/2021  -     US Retroperitoneal Complete (Kidney and; Future; Expected date: 01/09/2021    Hyperparathyroidism    Hypercalciuria         Plan:         Had hyperparathyroidism s/p parathyroidectomy in 12/18  24 hour urine 1/21/20 shows 389 calcium level   Last bmp ca was 9.0 in 9/2019  Last pth 12/2018 30 (prior to surgery 170)  Last stone was 80% ca phosphate 9/2019  Repeat kd ultrasound and xray show small stone sin both kidneys    I was going to order repeat pth, bmp, vit D(which i ordered last time but he didn't have done). Will see if dr carvalho wants any labs ordered .another 24 hour urine calcium ordere? [facility] or through [facility].     If his pth repeat is normal will defer to nephrology, start hctz  or send back to , could just be elevated calcium due to diet if pth remains low.  If his pth is high will def send back to      Keep flomax and toradol on hand and take if passing stone  Cannot recommend for or against "stone free" supplement       Pt will hold off on having labs done until we hear back from   If he hasn't heard from usin 2 weeks he will notify us     rbus and kub in 6 months, will continue to monitor stones and f/u after no matter     Kayla Abbott MD   "

## 2020-07-13 ENCOUNTER — TELEPHONE (OUTPATIENT)
Dept: UROLOGY | Facility: CLINIC | Age: 20
End: 2020-07-13

## 2020-07-13 NOTE — TELEPHONE ENCOUNTER
Please let pt know he should have the vit D, pth and bmp done  Then depending on these results will send him back to  (if he has elevated pth)  She said no need to repeat the 24 hour urine right now (the one done 1/2020) with elevated urine calcium

## 2020-07-13 NOTE — TELEPHONE ENCOUNTER
Spoke with patient's mom informed her of recommendations. Verbally voiced understanding. Will call back this afternoon to schedule blood work.

## 2020-07-13 NOTE — TELEPHONE ENCOUNTER
----- Message from Lakeisha Patricio MD sent at 7/13/2020 11:20 AM CDT -----  Regarding: RE: let me know what labs you want and if you want 24 hour urine repeated  No need to repeat the 24 hour urine now.    aob  ----- Message -----  From: Kayla Abbott MD  Sent: 7/13/2020  10:50 AM CDT  To: Lakeisha Patricio MD  Subject: RE: let me know what labs you want and if yo#    Hey I have orders for a vit d, pth and bmp but not sure if you wanted him to repeat the [email or web address] 24 hour urine (last one was from jan )  ----- Message -----  From: Lakeisha Patricio MD  Sent: 7/13/2020  10:19 AM CDT  To: Kayla Abbott MD, #  Subject: RE: let me know what labs you want and if yo#    Remind me.  Did you want me to place the orders for the labs or did you?  It appears that you have everything already.    ----- Message -----  From: Kayla Abbott MD  Sent: 7/9/2020   1:49 PM CDT  To: Lakeisha Patricio MD, #  Subject: let me know what labs you want and if you wa#    Hey Lakeisha!    you did his parathyroidectomy in 12/2018. last pth in 12/2018 was normal. but his repeat ca is elevated at 389 in his urine from january.     24 Hour Urine Results From [facility]  Date: 1/21/20    Urine volume (0.5-4L):1.50  SS CaOx (6-10): 8.22  Urine Calcium (mg/day) (male <250, female <200): 389  Urine Oxalate (mg/day) (20-40): 25  Urine Citrate (mg/day)(male>450, female>550): 487  SS CaP (0.5-2): 2.34  24 Hour Urine pH (5.8-6.2):6.078  SS Uric Acid (0-1): 0.86  Urine Uric Acid (g/day) (male<0.800, female<0.750): 0.758  Creatinine: 1718     Last bmp ca was 9.0 in 9/2019    Last pth 12/2018 30 (prior to surgery 170)    Last stone was 80% ca phosphate 9/2019    Repeat kd ultrasound and xray show small stone sin both kidneys    I was going to order repeat pth, bmp, vit D(which i ordered last time but he didn't have done). any other labs you want ordered. do youneed another 24 hour urine calcium ordered. i can order  thru [facility] or do you want it done through [facility].   If his pth repeat is normal do youstill want to see him or should we repeat the 24 hour. If his pth is high will def send back to you.     Carly Abbott

## 2020-07-14 ENCOUNTER — LAB VISIT (OUTPATIENT)
Dept: LAB | Facility: HOSPITAL | Age: 20
End: 2020-07-14
Attending: UROLOGY
Payer: COMMERCIAL

## 2020-07-14 DIAGNOSIS — N20.0 NEPHROLITHIASIS: ICD-10-CM

## 2020-07-14 LAB
25(OH)D3+25(OH)D2 SERPL-MCNC: 28 NG/ML (ref 30–96)
ANION GAP SERPL CALC-SCNC: 9 MMOL/L (ref 8–16)
BUN SERPL-MCNC: 10 MG/DL (ref 6–20)
CALCIUM SERPL-MCNC: 9.7 MG/DL (ref 8.7–10.5)
CHLORIDE SERPL-SCNC: 105 MMOL/L (ref 95–110)
CO2 SERPL-SCNC: 27 MMOL/L (ref 23–29)
CREAT SERPL-MCNC: 1 MG/DL (ref 0.5–1.4)
EST. GFR  (AFRICAN AMERICAN): >60 ML/MIN/1.73 M^2
EST. GFR  (NON AFRICAN AMERICAN): >60 ML/MIN/1.73 M^2
GLUCOSE SERPL-MCNC: 85 MG/DL (ref 70–110)
POTASSIUM SERPL-SCNC: 3.9 MMOL/L (ref 3.5–5.1)
PTH-INTACT SERPL-MCNC: 23 PG/ML (ref 9–77)
SODIUM SERPL-SCNC: 141 MMOL/L (ref 136–145)

## 2020-07-14 PROCEDURE — 82306 VITAMIN D 25 HYDROXY: CPT

## 2020-07-14 PROCEDURE — 80048 BASIC METABOLIC PNL TOTAL CA: CPT

## 2020-07-14 PROCEDURE — 83970 ASSAY OF PARATHORMONE: CPT

## 2020-07-14 PROCEDURE — 36415 COLL VENOUS BLD VENIPUNCTURE: CPT

## 2020-07-15 ENCOUNTER — TELEPHONE (OUTPATIENT)
Dept: UROLOGY | Facility: CLINIC | Age: 20
End: 2020-07-15

## 2020-07-15 NOTE — TELEPHONE ENCOUNTER
Please make sure pt reviews the following msg via my chart and makes an appt with Dr.Dendy Ninfa Wiseman,  Your labs are all mostly normal. Your parathyroid hormone is normal (which is good). Your vitamin D is only a little low. Your calcium is on the higher end of normal. The most concerning thing about your urine is that you still have significant hypercalciuria. That means calcium in your urine. I want you to see  who is with endocrine at [facility]. I have been talking with  and that is whom she also recommends you see. He has reviewed some of your labs already and has some medicine recommendations. There is a possiblity you can see him virtually, just ask.  Please let me know if his office has not called to make f/u with you. It does sound like he can see you this upcoming Monday though.      BMP  Lab Results   Component Value Date     07/14/2020    K 3.9 07/14/2020     07/14/2020    CO2 27 07/14/2020    BUN 10 07/14/2020    CREATININE 1.0 07/14/2020    CALCIUM 9.7 07/14/2020    ANIONGAP 9 07/14/2020    ESTGFRAFRICA >60 07/14/2020    EGFRNONAA >60 07/14/2020

## 2020-07-16 ENCOUNTER — TELEPHONE (OUTPATIENT)
Dept: SURGERY | Facility: CLINIC | Age: 20
End: 2020-07-16

## 2020-07-16 NOTE — TELEPHONE ENCOUNTER
Left message on patient's voicemail for him to call for an appointment with Endocrinology.  [place] [facility] [place]  Direct number given for him to call back.

## 2020-08-14 ENCOUNTER — PATIENT OUTREACH (OUTPATIENT)
Dept: ADMINISTRATIVE | Facility: OTHER | Age: 20
End: 2020-08-14

## 2020-08-14 NOTE — PROGRESS NOTES
Chart reviewed.   Immunizations: Triggered Imm Registry     Orders placed: n/a  Upcoming appts to satisfy KATIA topics: n/a

## 2020-08-17 ENCOUNTER — OFFICE VISIT (OUTPATIENT)
Dept: ENDOCRINOLOGY | Facility: CLINIC | Age: 20
End: 2020-08-17
Attending: INTERNAL MEDICINE
Payer: COMMERCIAL

## 2020-08-17 DIAGNOSIS — E21.3 HYPERPARATHYROIDISM: ICD-10-CM

## 2020-08-17 DIAGNOSIS — N20.0 NEPHROLITHIASIS: ICD-10-CM

## 2020-08-17 DIAGNOSIS — R82.994 HYPERCALCIURIA: Primary | ICD-10-CM

## 2020-08-17 DIAGNOSIS — E83.52 HYPERCALCEMIA: ICD-10-CM

## 2020-08-17 PROCEDURE — 99203 OFFICE O/P NEW LOW 30 MIN: CPT | Mod: 95,,, | Performed by: INTERNAL MEDICINE

## 2020-08-17 PROCEDURE — 99203 PR OFFICE/OUTPT VISIT, NEW, LEVL III, 30-44 MIN: ICD-10-PCS | Mod: 95,,, | Performed by: INTERNAL MEDICINE

## 2020-08-17 NOTE — PATIENT INSTRUCTIONS
We will repeat your 24hr urine calcium to evaluate for idiopathic Hypercalciuria     If your urine calcium is still elevated we will start you on Hydrochlorothiazide 12.5mg (This is a diuretic that helps re-absorbing calcium from your kidneys preventing loss of calcium on your urine and preventing further stones)     Start Taking Vitamin D 800 units daily. You can buy them over the counter.     Drink up to 2L of water daily. Avoid sodas as much as possible.     I will call you with urine results and next steps to follow.     Thank you for your virtual visit.       Ac Santana MD   Endocrinology   8/17/2020 12:27 PM

## 2020-08-17 NOTE — PROGRESS NOTES
I have reviewed and concur with the fellow's history, assessment, and plan.  I supervised and interacted with the fellow during the patient examination via real-time, audio/video telecommunications technology, and all questions were answered.    Will repeat 24 hr urine for Ca/Cr, renal panel and PTH. If still with evidence of hypercalciuria will recommend trial of HCTZ. No biochemical evidence of primary HPT recurrence on labs.

## 2020-08-17 NOTE — PROGRESS NOTES
Subjective:      Patient ID: Juan Jose Wiseman is a 20 y.o. male.    Chief Complaint:  Primary hyperparathyroidism s/p parathyroidectomy follow up       History of Present Illness  19 YO Male w/ diagnosis of primary hyperparathyroidism s/p 1 gland parathyroidectomy and recurrent nephrolithiasis for virtual visit. Pt today reports feels well and denies any complaints. Pt endorses having recurrent nephrolithiasis after parathyroidectomy on 12/18. Last kidney stones recurring lithotripsy on October 2019. Pt denies taking calcium supplements or tums. Pt reports staying hydrated.      In regards to Primary hyperparathyroidism  -S/p 1 gland parathyroidectomy on 12/18   -Pathology:  Parathyroid adenoma   -MEN 1 and 2 Genetic testing (NEGATIVE)   -Urine metanephrines WNL   -PTH 23 on 7/20 (WNL)   -Ca 9.7 WNL   - Phos  2.6 (Low) on 12/18 at time of the surgery which is expected   -Vitamin D  28 (Low) on 7/20   -24hr Urinary calcium  (390mg/spec)  ELEVATED on 7/18  (Pre- parathyroidectomy) and 389 mg/spec (ELEVATED ON 1/20) CONCERNING FOR HYPERCALCIURIA   -No symptoms of hypercalcemia. No serum hypercalcemia   -Calcium supplements: DENIES   -GFR > 60   -History of recurrent kidney stones, Last episode 10/19. (Pt has had 3 episodes of nephrolithiasis after parathyroidectomy on 2018.         Review of Systems   Constitutional: Negative for chills and fatigue.   HENT: Negative for rhinorrhea and sore throat.    Eyes: Negative for discharge and visual disturbance.   Respiratory: Negative for shortness of breath and wheezing.    Cardiovascular: Negative for chest pain and leg swelling.   Gastrointestinal: Negative for abdominal distention and abdominal pain.   Endocrine: Negative for cold intolerance and polydipsia.   Musculoskeletal: Negative for arthralgias and myalgias.   Skin: Negative for rash and wound.   Neurological: Negative for dizziness, weakness and light-headedness.   Psychiatric/Behavioral: Negative for agitation and  sleep disturbance.       Objective:     There were no vitals taken for this visit.  BP Readings from Last 3 Encounters:   07/09/20 (P) 111/60   06/12/20 104/62   01/02/20 109/61     Wt Readings from Last 1 Encounters:   07/09/20 1304 76.8 kg (169 lb 5 oz)     There is no height or weight on file to calculate BMI.      Physical Exam   (NOT PERFORMED)   VIRTUAL VISIT            Lab Review:   No results found for: HGBA1C  No results found for: CHOL, HDL, LDLCALC, TRIG, CHOLHDL  Lab Results   Component Value Date     07/14/2020    K 3.9 07/14/2020     07/14/2020    CO2 27 07/14/2020    GLU 85 07/14/2020    BUN 10 07/14/2020    CREATININE 1.0 07/14/2020    CALCIUM 9.7 07/14/2020    PROT 7.7 09/11/2019    ALBUMIN 4.8 09/11/2019    BILITOT 0.6 09/11/2019    ALKPHOS 59 09/11/2019    AST 17 09/11/2019    ALT 26 09/11/2019    ANIONGAP 9 07/14/2020    ESTGFRAFRICA >60 07/14/2020    EGFRNONAA >60 07/14/2020     Vit D, 25-Hydroxy   Date Value Ref Range Status   07/14/2020 28 (L) 30 - 96 ng/mL Final     Comment:     Vitamin D deficiency.........<10 ng/mL                              Vitamin D insufficiency......10-29 ng/mL       Vitamin D sufficiency........> or equal to 30 ng/mL  Vitamin D toxicity............>100 ng/mL         Assessment and Plan     Hyperparathyroidism  -S/p 1 gland parathyroidectomy on 12/18   -Pathology:  Parathyroid adenoma   -MEN 1 and 2 Genetic testing (NEGATIVE)   -PTH 23 on 7/20 (WNL)   (No evidence of recurrence)   -Ca 9.7 WNL   -Vitamin D  28 (Low) on 7/20   -24hr Urinary calcium  (390mg/spec)  ELEVATED on 7/18  (Pre- parathyroidectomy) and 389 mg/spec (ELEVATED ON 1/20) CONCERNING FOR HYPERCALCIURIA   -History of recurrent kidney stones, Last episode 10/19. (Pt has had 3 episodes of nephrolithiasis after parathyroidectomy on 2018.     Plan:   -Due to evidence of hypercalciuria and recurrent kidney stones in the setting of prior primary hyperparathyroidism which seems to be in remission,  will do the following recommendations.     -Repeat 24hr Urine Calcium and Cr  (Evaluate for hypercalciuria)     -Repeat Renal panel     -If patient with elevated 24hr urine calcium, will consider  Starting HCTZ 12.5mg daily for treatment of hypercalciuria, with periodical testing of Calcium and PTH levels, due to prior history of primary hyperparathyroidism     -Encourage PO hydration     -Can consider potassium citrate for stone prevention               The patient location is:  HOME   The chief complaint leading to consultation is:  Hypercalciuria     Visit type: audiovisual    Face to Face time with patient: 30 minutes    45  minutes of total time spent on the encounter, which includes face to face time and non-face to face time preparing to see the patient (eg, review of tests), Obtaining and/or reviewing separately obtained history, Documenting clinical information in the electronic or other health record, Independently interpreting results (not separately reported) and communicating results to the patient/family/caregiver, or Care coordination (not separately reported).         Each patient to whom he or she provides medical services by telemedicine is:  (1) informed of the relationship between the physician and patient and the respective role of any other health care provider with respect to management of the patient; and (2) notified that he or she may decline to receive medical services by telemedicine and may withdraw from such care at any time.

## 2020-08-17 NOTE — ASSESSMENT & PLAN NOTE
-S/p 1 gland parathyroidectomy on 12/18   -Pathology:  Parathyroid adenoma   -MEN 1 and 2 Genetic testing (NEGATIVE)   -PTH 23 on 7/20 (WNL)   (No evidence of recurrence)   -Ca 9.7 WNL   -Vitamin D  28 (Low) on 7/20   -24hr Urinary calcium  (390mg/spec)  ELEVATED on 7/18  (Pre- parathyroidectomy) and 389 mg/spec (ELEVATED ON 1/20) CONCERNING FOR HYPERCALCIURIA   -History of recurrent kidney stones, Last episode 10/19. (Pt has had 3 episodes of nephrolithiasis after parathyroidectomy on 2018.     Plan:   -Due to evidence of hypercalciuria and recurrent kidney stones in the setting of prior primary hyperparathyroidism which seems to be in remission, will do the following recommendations.     -Repeat 24hr Urine Calcium and Cr  (Evaluate for hypercalciuria)     -Repeat Renal panel     -If patient with elevated 24hr urine calcium, will consider  Starting HCTZ 12.5mg daily for treatment of hypercalciuria, with periodical testing of Calcium and PTH levels, due to prior history of primary hyperparathyroidism     -Encourage PO hydration     -Can consider potassium citrate for stone prevention

## 2020-09-04 ENCOUNTER — LAB VISIT (OUTPATIENT)
Dept: LAB | Facility: HOSPITAL | Age: 20
End: 2020-09-04
Attending: GENERAL ACUTE CARE HOSPITAL
Payer: COMMERCIAL

## 2020-09-04 DIAGNOSIS — N20.0 NEPHROLITHIASIS: ICD-10-CM

## 2020-09-04 DIAGNOSIS — R82.994 HYPERCALCIURIA: ICD-10-CM

## 2020-09-04 DIAGNOSIS — E83.52 HYPERCALCEMIA: ICD-10-CM

## 2020-09-04 LAB
CREAT 24H UR-MRATE: 78.8 MG/HR (ref 40–75)
CREAT UR-MCNC: 168.8 MG/DL (ref 23–375)
CREATININE, URINE (MG/SPEC): 1890.6 MG/SPEC
URINE COLLECTION DURATION: 24 HR
URINE VOLUME: 1120 ML

## 2020-09-04 PROCEDURE — 82570 ASSAY OF URINE CREATININE: CPT

## 2020-09-04 PROCEDURE — 82340 ASSAY OF CALCIUM IN URINE: CPT

## 2020-09-05 LAB
CALCIUM 24H UR-MRATE: 7 MG/HR (ref 4–12)
CALCIUM UR-MCNC: 15.2 MG/DL (ref 0–15)
CALCIUM URINE (MG/SPEC): 170 MG/SPEC
URINE COLLECTION DURATION: 24 HR
URINE VOLUME: 1120 ML

## 2020-09-10 ENCOUNTER — PATIENT MESSAGE (OUTPATIENT)
Dept: ENDOCRINOLOGY | Facility: CLINIC | Age: 20
End: 2020-09-10

## 2020-09-10 DIAGNOSIS — E83.52 HYPERCALCEMIA: ICD-10-CM

## 2020-09-10 DIAGNOSIS — N20.0 NEPHROLITHIASIS: ICD-10-CM

## 2020-09-10 DIAGNOSIS — R82.994 HYPERCALCIURIA: Primary | ICD-10-CM

## 2020-09-11 NOTE — TELEPHONE ENCOUNTER
19 YO Male w/ Primary hyperparathyroidism s/p 1 gland parathyroidectomy on 12/18 (Now in remission) and recurrent nephrolithiasis after parathyroidectomy suspicious for hypercalciuria.      24 Hr Urine Calcium results:     -7/2018:  24hr Urinary calcium  (390mg/spec) (Pre parathyroidectomy)     -1/2020:  24hr Urinary calcium (389mg/spec) (Post parathyroidectomy, concerning for hypercalciuria)     -9/2020:  24hr urinary calcium (170mg/spec) WNL       Plan:   -At this time there is no evidence of hypercalciuria on labs from 9/2020.  Will send urinary stone risk profile for confirmation     -If evidence of hypercalciuria on repeat will consider initiating HCTZ 12.5mg

## 2020-09-18 ENCOUNTER — PATIENT MESSAGE (OUTPATIENT)
Dept: ENDOCRINOLOGY | Facility: CLINIC | Age: 20
End: 2020-09-18

## 2020-09-23 ENCOUNTER — PATIENT MESSAGE (OUTPATIENT)
Dept: ENDOCRINOLOGY | Facility: HOSPITAL | Age: 20
End: 2020-09-23

## 2020-10-01 ENCOUNTER — TELEPHONE (OUTPATIENT)
Dept: ENDOCRINOLOGY | Facility: CLINIC | Age: 20
End: 2020-10-01

## 2020-10-01 NOTE — TELEPHONE ENCOUNTER
----- Message from Hawa Hendricks sent at 10/1/2020 11:46 AM CDT -----  Regarding: pt advice  Contact: [facility] @ [phone]  [facility] ([facility]) calling to speak with Dr. Buenrostro regarding canceling a test ordered for the patient. Please call.

## 2020-10-05 ENCOUNTER — TELEPHONE (OUTPATIENT)
Dept: ENDOCRINOLOGY | Facility: CLINIC | Age: 20
End: 2020-10-05

## 2020-10-05 DIAGNOSIS — R82.994 HYPERCALCIURIA: ICD-10-CM

## 2020-10-05 DIAGNOSIS — N20.0 NEPHROLITHIASIS: Primary | ICD-10-CM

## 2020-10-21 ENCOUNTER — TELEPHONE (OUTPATIENT)
Dept: ENDOCRINOLOGY | Facility: CLINIC | Age: 20
End: 2020-10-21

## 2020-10-21 NOTE — TELEPHONE ENCOUNTER
----- Message from Nargis Crane RN sent at 10/19/2020  7:25 PM CDT -----  Regarding: FW: pt advice    ----- Message -----  From: Damian Cochran  Sent: 10/19/2020   9:47 AM CDT  To: Chitra Shen Staff  Subject: pt advice                                        Pt's mom states that hey dropped off urine sample to lab at [facility] in [place] on 9/28 but never was contacted back. Please give pt's mom a call back for next steps at [phone] .

## 2020-12-09 ENCOUNTER — HOSPITAL ENCOUNTER (OUTPATIENT)
Dept: RADIOLOGY | Facility: HOSPITAL | Age: 20
Discharge: HOME | End: 2020-12-09
Attending: UROLOGY
Payer: COMMERCIAL

## 2020-12-09 DIAGNOSIS — N20.0 NEPHROLITHIASIS: ICD-10-CM

## 2020-12-09 PROCEDURE — 76770 US RETROPERITONEAL COMPLETE: ICD-10-PCS | Mod: 26,,, | Performed by: RADIOLOGY

## 2020-12-09 PROCEDURE — 74018 RADEX ABDOMEN 1 VIEW: CPT | Mod: TC,FY

## 2020-12-09 PROCEDURE — 74018 RADEX ABDOMEN 1 VIEW: CPT | Mod: 26,,, | Performed by: RADIOLOGY

## 2020-12-09 PROCEDURE — 74018 XR ABDOMEN AP 1 VIEW: ICD-10-PCS | Mod: 26,,, | Performed by: RADIOLOGY

## 2020-12-09 PROCEDURE — 76770 US EXAM ABDO BACK WALL COMP: CPT | Mod: 26,,, | Performed by: RADIOLOGY

## 2020-12-09 PROCEDURE — 76770 US EXAM ABDO BACK WALL COMP: CPT | Mod: TC

## 2020-12-13 ENCOUNTER — PATIENT OUTREACH (OUTPATIENT)
Dept: ADMINISTRATIVE | Facility: OTHER | Age: 20
End: 2020-12-13

## 2020-12-13 NOTE — PROGRESS NOTES
Chart was reviewed for overdue Proactive [facility] Encounters (KATIA)  topics  Updates were requested from care everywhere  Health Maintenance was unable to be updated  LINKS immunization registry triggered

## 2020-12-14 ENCOUNTER — LAB VISIT (OUTPATIENT)
Dept: LAB | Facility: HOSPITAL | Age: 20
End: 2020-12-14
Attending: UROLOGY
Payer: COMMERCIAL

## 2020-12-14 ENCOUNTER — OFFICE VISIT (OUTPATIENT)
Dept: UROLOGY | Facility: CLINIC | Age: 20
End: 2020-12-14
Payer: COMMERCIAL

## 2020-12-14 VITALS
DIASTOLIC BLOOD PRESSURE: 68 MMHG | HEART RATE: 60 BPM | SYSTOLIC BLOOD PRESSURE: 113 MMHG | WEIGHT: 183.56 LBS | HEIGHT: 69 IN | BODY MASS INDEX: 27.19 KG/M2

## 2020-12-14 DIAGNOSIS — N20.0 NEPHROLITHIASIS: ICD-10-CM

## 2020-12-14 DIAGNOSIS — N20.0 NEPHROLITHIASIS: Primary | ICD-10-CM

## 2020-12-14 LAB
BILIRUB UR QL STRIP: NEGATIVE
CLARITY UR: CLEAR
COLOR UR: YELLOW
GLUCOSE UR QL STRIP: NEGATIVE
HGB UR QL STRIP: NEGATIVE
KETONES UR QL STRIP: NEGATIVE
LEUKOCYTE ESTERASE UR QL STRIP: NEGATIVE
NITRITE UR QL STRIP: NEGATIVE
PH UR STRIP: 7 [PH] (ref 5–8)
PROT UR QL STRIP: NEGATIVE
SP GR UR STRIP: 1.01 (ref 1–1.03)
URN SPEC COLLECT METH UR: NORMAL
UROBILINOGEN UR STRIP-ACNC: NEGATIVE EU/DL

## 2020-12-14 PROCEDURE — 81003 URINALYSIS AUTO W/O SCOPE: CPT

## 2020-12-14 PROCEDURE — 99999 PR PBB SHADOW E&M-EST. PATIENT-LVL III: ICD-10-PCS | Mod: PBBFAC,,, | Performed by: UROLOGY

## 2020-12-14 PROCEDURE — 3008F PR BODY MASS INDEX (BMI) DOCUMENTED: ICD-10-PCS | Mod: CPTII,S$GLB,, | Performed by: UROLOGY

## 2020-12-14 PROCEDURE — 99999 PR PBB SHADOW E&M-EST. PATIENT-LVL III: CPT | Mod: PBBFAC,,, | Performed by: UROLOGY

## 2020-12-14 PROCEDURE — 3008F BODY MASS INDEX DOCD: CPT | Mod: CPTII,S$GLB,, | Performed by: UROLOGY

## 2020-12-14 PROCEDURE — 99214 OFFICE O/P EST MOD 30 MIN: CPT | Mod: S$GLB,,, | Performed by: UROLOGY

## 2020-12-14 PROCEDURE — 99214 PR OFFICE/OUTPT VISIT, EST, LEVL IV, 30-39 MIN: ICD-10-PCS | Mod: S$GLB,,, | Performed by: UROLOGY

## 2020-12-14 NOTE — PROGRESS NOTES
[facility] Progress Note - [place]   Staff: MD Milena  Group:Milena/Richard/Junito/William  Referring provider and please cc:    PCP: Rajni Hickey MD        Subjective:      HPI: Juan Jose Wiseman is a 19 y.o. male      Right uvj stone s/p ureteroscopy with hypercalciuria and hyperparahtyroidism  -He went to ER on 5/18/18 for right flank pain 10/10 that had been present for 3-4 days. +nausea and vomiting. No fever. +frequency and urgency. No dysuria and decreased UOP (likely from dehydration). CTAP with contrast showed a 7mm right distal ureteral stone with proximal hydronephrosis. No other stones seen on ct scan.  WBC was 12.7, cr was normal. ALT 49, Calcium 11.3. Ua showed blood and protein, hyaline casts. He was sent home with norco, zofran and flomax.  failed trial of passge  -6/13/18 underwent right urs and stone extraction with stent on strings. Removed stent a few days later without any problems. This was his only and firs stone. Stones were 60% ca phosphate/40%ca phosphate apatite   -I had him see  in [place] for persistent elevated pth (270s then 220ss) and elevated calcium 11.9. He underwent a parathyroidectomy in 12/7/18. F/u pth was normal.   -last seen by me on 7/27/19 and this was his first and only stone. Plan was for f/u prn. He called 9/5 with c/o GH and difficulty urinating. I recommended a kub,rbus and f/u. They did not make f/u due to work and school. Came to er 9/11/19 with c/o nausea/vomiting/hematuria/pain and chills. ctrss in er showed a RUP 6mm stone, R UVJ 8mm stone and bilateral smaller obstructing stones. Underwent extraction of distal right stone and stent placed. Stone was 80% calcium phosphate.   -returned on 9/25/19 for extraction of remaining R renal stones (3 stones, RUP 7mm, RLP 3mm, RMP 2mm) they had moved to ureter. Some randalls plaques seen. Removed stent at home.    Interval history 1/2/20:   Called c/o flan pain around 11/12/19.  "Started him on flomax and toradol. kub and rbus on 11/13/19 showed no hydro and possible 6mm stone but had also a known 1mm stone in L kidney. ua was negative for blood. I asked him to stay on flomax and toradol, stayed on flomax.  He then went to health food store and picked up "stone free" and took 5x a day and passed about 3 small stone fragments (saw them in toilet) on 11/17/19. Since then no flank pain.    Interval history 7/9/20:     kub 7/6/20: LLP stone. rbus 7/6/20: 3 right renal stones (3mm).  2 small left renal stones (4,5mm)    24 Hour Urine Results From [facility]  Date: 1/21/20  Urine Calcium (mg/day) (male <250, female <200): 389    7/14/20, pth 23.0, vit D 28, ca 9.7    Saw Dr. Santana/endocrine 9/2020 notes-   24 Hr Urine Calcium results:   -7/2018:  24hr Urinary calcium  (390mg/spec) (Pre parathyroidectomy)   -1/2020:  24hr Urinary calcium (389mg/spec) (Post parathyroidectomy, concerning for hypercalciuria)   -9/2020:  24hr urinary calcium (170mg/spec) WNL   Plan:   -At this time there is no evidence of hypercalciuria on labs from 9/2020.  Will send urinary stone risk profile for confirmation        Interval history 12/14/20:   rbus 12/9/20: 8mm & 7mm stone. No hydro. L kidney no stones. (last ct 9/11/19 showed RUP 8mm stone and RLP 5mm stone) and I only had removed his UVJ stone.   kub 12/9/20: no obvious stones    He admits to poor fluid intake. Hasn't passed any stones. No flank pain. No GH.   No urine today and cannot provide any      ua history  11/13/19          No cx, void: neg  9/18/19 Ng, void: 3+bld/tr leuk, >100 rbc (stent in)  9/11/19 Ng, orange,  25 rbc/4 wbc   6/12/18            Ng, void: tr blood  6/6/18              Ng, void: tr bld/trketones  5/25/18            Ng, void: tr leuk and blood, cath: 2+blood  5/18/18            No cx, void: large bloodNo family hx of stones.      REVIEW OF SYSTEMS:  General ROS: no fevers, no chills  Psychological ROS: no depression  Endocrine ROS: no heat " or cold  Respiratory ROS: no SOB  Cardiovascular ROS: no CP  Gastrointestinal ROS: no abdominal pain, no constipation, no diarrhea, noBRBPR  Musculoskeletal ROS: no muscle pain  Neurological ROS: no headaches  Dermatological ROS: no rashes  HEENT: nonglasses, no sinus   ROS: per HPI     PMHx:  Past Medical History:   Diagnosis Date    Hyperparathyroidism           PSHx:  Past Surgical History:   Procedure Laterality Date    CYSTOSCOPY WITH CALCULUS EXTRACTION Right 9/25/2019    Procedure: CYSTOSCOPY, WITH CALCULUS REMOVAL;  Surgeon: Kayla Abbott MD;  Location: [facility];  Service: Urology;  Laterality: Right;    CYSTOSCOPY WITH URETEROSCOPY, RETROGRADE PYELOGRAPHY, AND INSERTION OF STENT Right 6/13/2018    Procedure: CYSTOSCOPY, WITH RETROGRADE PYELOGRAM AND URETERAL STENT INSERTION;  Surgeon: Kayla Abbott MD;  Location: [facility] OR;  Service: Urology;  Laterality: Right;    CYSTOURETEROSCOPY WITH RETROGRADE PYELOGRAPHY AND INSERTION OF STENT INTO URETER Right 9/11/2019    Procedure: CYSTOURETEROSCOPY, WITH RETROGRADE PYELOGRAM AND URETERAL STENT INSERTION;  Surgeon: Kayla Abbott MD;  Location: [facility];  Service: Urology;  Laterality: Right;    CYSTOURETEROSCOPY WITH RETROGRADE PYELOGRAPHY AND INSERTION OF STENT INTO URETER Right 9/25/2019    Procedure: CYSTOURETEROSCOPY, WITH RETROGRADE PYELOGRAM AND URETERAL STENT INSERTION [2531];  Surgeon: Kayla Abbott MD;  Location: [facility] OR;  Service: Urology;  Laterality: Right;  flexible urs    head stiches  2009 2006 left pinky stiches    LASER LITHOTRIPSY Right 6/13/2018    Procedure: LITHOTRIPSY, USING LASER;  Surgeon: Kayla Abbott MD;  Location: [facility];  Service: Urology;  Laterality: Right;    LASER LITHOTRIPSY Right 9/11/2019    Procedure: LITHOTRIPSY, USING LASER;  Surgeon: Kayla Abbott MD;  Location: [facility] OR;  Service: Urology;  Laterality: Right;    PARATHYROIDECTOMY N/A 12/7/2018     Procedure: PARATHYROIDECTOMY;  Surgeon: Lakeisha Patricio MD;  Location: [facility];  Service: General;  Laterality: N/A;  NIMS & Intraop PTH monitoring     TONSILLECTOMY      TYMPANOSTOMY TUBE PLACEMENT      URETEROSCOPIC REMOVAL OF URETERIC CALCULUS Right 6/13/2018    Procedure: REMOVAL, CALCULUS, URETER, URETEROSCOPIC;  Surgeon: Kayla Abbott MD;  Location: [facility] OR;  Service: Urology;  Laterality: Right;    URETEROSCOPIC REMOVAL OF URETERIC CALCULUS  9/11/2019    Procedure: REMOVAL, CALCULUS, URETER, URETEROSCOPIC;  Surgeon: Kayla Abbott MD;  Location: [facility] OR;  Service: Urology;;       Stents/Valves/Foreign Bodies: No  Cardiac Evaluation: No     Screening Studies  Colonoscopy: no     Fam Hx:   malignancies: No  . Gyn malignancies: no.   kidney stones: No      Soc Hx:  + tobacco.  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 No wheezing.   Cardiovascular: No obvious extremity edema. Warm and well perfused.   GI: no palpation of masses. No tenderness. No hepatosplenomegaly to palpation.  Musculoskeletal: normal range of motion of bilateral upper extremities. Normal muscle strength and tone.  Skin: no obvious rashes or lesions. No tightening of skin noted.  Neurologic: CN grossly normal. Normal sensation.   Psychiatric: awake, alert and oriented x 3. Mood and affect normal. Cooperative.      exam   deferred     LABS REVIEW:  Recent Labs   Lab 06/12/19  1155 09/11/19  1353 09/12/19  0352   WBC 6.86 12.75 H 9.08   Hemoglobin 14.1 14.3 12.6 L   Hematocrit 42.6 43.3 38.8 L    Platelets 250 204 191   ]  Recent Labs   Lab 07/16/18  0852 10/11/18  1428 12/12/18  1036 06/12/19  1155 09/11/19  1353 09/12/19  0352 07/14/20  1300   Sodium 138 140 138 139 138 139 141   Potassium 4.5 4.1 4.3 4.1 3.9 4.1 3.9   Chloride 106 104 104 105 104 106 105   CO2 26 27 25 22 L 25 25 27   BUN 9 8 11 14 9 7 10   Creatinine 0.8 0.7 0.8 0.8 0.8 0.7 1.0   Glucose 96 115 H 89 81 98 118 H 85   Calcium 11.9 H 12.2 HH 10.1 10.1 9.6 9.0 9.7   Phosphorus 2.7 2.0 L 2.6 L  --   --   --   --    Alkaline Phosphatase 124  --   --  67 59  --   --    Total Protein 7.4  --   --  7.6 7.7  --   --    Albumin 4.5 4.3 4.4 4.5 4.8  --   --    Total Bilirubin 0.8  --   --  0.4 0.6  --   --    AST 16  --   --  17 17  --   --    ALT 26  --   --  21 26  --   --    ]    No results found for: LABA1C, HGBA1C          Pertinent urologic PATHOLOGY REVIEW: See hpi for recent     Stones were 60% ca phosphate/40%ca phosphate apatite      Pertinent Urologic RADIOGRAPHIC REVIEW: See hpi for recent     rbus 11/13/19  Right kidney: The right kidney measures 10.2 cm. No cortical thinning. No loss of corticomedullary distinction. Resistive index measures 0.62.  No mass. A couple 6 mm echogenic foci suggesting stones no hydronephrosis.    Left kidney: The left kidney measures 10.7 cm. No cortical thinning. No loss of corticomedullary distinction. Resistive index measures 0.50.  No mass. No renal stone. No hydronephrosis.    The bladder is image but the bladder nearly completely empty at the time of the exam.  Bladder wall does appear diffusely thickened questioning cystitis but the appearance is accentuated and can be due to the incomplete degree of distention.  Ureteral jets not visualized    kub 11/12/19  There is a suspected 2 mm stone of the lower pole of the right kidney.  No stones are seen overlying the left renal shadow or along the courses of the ureters    ctrss 9/11/19  Lung bases clear.  Normal size heart.  Nondistended  gallbladder.  There are several stones in the right renal collecting system, at least 3 in number with largest measuring 7 mm.  1 mm stone in the left renal collecting system.  There is hydroureteronephrosis on the right with a 7 mm stone near the UVJ.  Remaining solid abdominal organs grossly unremarkable on this noncontrast exam.  There is no enteric contrast which limits bowel assessment.  No dilated loops of bowel.  Normal appendix.  Unremarkable noncontrast prostate.  Several prominent but nonenlarged mesenteric and retroperitoneal lymph nodes.  No acute osseous abnormality.     rbus 6/11/18  No hydro and possible right renal stone     kub 6/11/18  Nonspecific 5 mm calcification right hemipelvis, for which a distal right ureteral stone cannot be excluded     ctap w 5/18/18 smh  7mm distal stone with proximal hydro   No other stones        Assessment:      There are no diagnoses linked to this encounter.     Plan:         Had hyperparathyroidism s/p parathyroidectomy in 12/18  24 hour urine 1/21/20 shows 389 calcium level   Last bmp ca was 9.0 in 9/2019  Last pth 12/2018 30 (prior to surgery 170)  Last stone was 80% ca phosphate 9/2019  Saw  who repeated 24 hour urine calcium and returned normal at 170.     Still has 7-8mm RUP stone and 5-7mm RLP stone seen on ct 9/2019 and rbus. However not seen on kub.   Will hold off on treating unless he passes stone or can see kub/xray bc cannot offer ESWL without seeing stone on xray  Has flomax and toradol if passing stone  Call if passing stone unless fever    F/u in 6months with rbus and kub  Drop off urine at lab this week (standing lab ua ordered) and ensure no blood in urine    Kayla Abbott MD

## 2020-12-14 NOTE — PATIENT INSTRUCTIONS
Had hyperparathyroidism s/p parathyroidectomy in 12/18  24 hour urine 1/21/20 shows 389 calcium level   Last bmp ca was 9.0 in 9/2019  Last pth 12/2018 30 (prior to surgery 170)  Last stone was 80% ca phosphate 9/2019  Saw  who repeated 24 hour urine calcium and returned normal at 170.     Still has 7-8mm RUP stone and 5-7mm RLP stone seen on ct 9/2019 and rbus. However not seen on kub.   Will hold off on treating unless he passes stone or can see kub/xray bc cannot offer ESWL without seeing stone on xray  Has flomax and toradol if passing stone  Call if passing stone unless fever    F/u in 6months with rbus and kub  Drop off urine at lab this week (standing lab ua ordered)

## 2021-05-07 ENCOUNTER — OFFICE VISIT (OUTPATIENT)
Dept: FAMILY MEDICINE | Facility: CLINIC | Age: 21
End: 2021-05-07
Payer: COMMERCIAL

## 2021-05-07 VITALS
HEIGHT: 69 IN | OXYGEN SATURATION: 98 % | TEMPERATURE: 98 F | WEIGHT: 180.75 LBS | SYSTOLIC BLOOD PRESSURE: 122 MMHG | RESPIRATION RATE: 18 BRPM | DIASTOLIC BLOOD PRESSURE: 64 MMHG | BODY MASS INDEX: 26.77 KG/M2 | HEART RATE: 72 BPM

## 2021-05-07 DIAGNOSIS — M89.8X9 BONY PROMINENCE: Primary | ICD-10-CM

## 2021-05-07 PROCEDURE — 99212 OFFICE O/P EST SF 10 MIN: CPT | Mod: S$GLB,,, | Performed by: FAMILY MEDICINE

## 2021-05-07 PROCEDURE — 3008F PR BODY MASS INDEX (BMI) DOCUMENTED: ICD-10-PCS | Mod: CPTII,S$GLB,, | Performed by: FAMILY MEDICINE

## 2021-05-07 PROCEDURE — 3008F BODY MASS INDEX DOCD: CPT | Mod: CPTII,S$GLB,, | Performed by: FAMILY MEDICINE

## 2021-05-07 PROCEDURE — 99212 PR OFFICE/OUTPT VISIT, EST, LEVL II, 10-19 MIN: ICD-10-PCS | Mod: S$GLB,,, | Performed by: FAMILY MEDICINE

## 2021-05-07 PROCEDURE — 99999 PR PBB SHADOW E&M-EST. PATIENT-LVL IV: CPT | Mod: PBBFAC,,, | Performed by: FAMILY MEDICINE

## 2021-05-07 PROCEDURE — 1126F PR PAIN SEVERITY QUANTIFIED, NO PAIN PRESENT: ICD-10-PCS | Mod: S$GLB,,, | Performed by: FAMILY MEDICINE

## 2021-05-07 PROCEDURE — 1126F AMNT PAIN NOTED NONE PRSNT: CPT | Mod: S$GLB,,, | Performed by: FAMILY MEDICINE

## 2021-05-07 PROCEDURE — 99999 PR PBB SHADOW E&M-EST. PATIENT-LVL IV: ICD-10-PCS | Mod: PBBFAC,,, | Performed by: FAMILY MEDICINE

## 2021-05-18 ENCOUNTER — TELEPHONE (OUTPATIENT)
Dept: UROLOGY | Facility: CLINIC | Age: 21
End: 2021-05-18

## 2021-05-22 ENCOUNTER — HOSPITAL ENCOUNTER (OUTPATIENT)
Dept: RADIOLOGY | Facility: HOSPITAL | Age: 21
Discharge: HOME | End: 2021-05-22
Attending: UROLOGY
Payer: COMMERCIAL

## 2021-05-22 DIAGNOSIS — N20.0 NEPHROLITHIASIS: ICD-10-CM

## 2021-05-22 PROCEDURE — 74018 RADEX ABDOMEN 1 VIEW: CPT | Mod: 26,,, | Performed by: RADIOLOGY

## 2021-05-22 PROCEDURE — 76770 US RETROPERITONEAL COMPLETE: ICD-10-PCS | Mod: 26,,, | Performed by: RADIOLOGY

## 2021-05-22 PROCEDURE — 76770 US EXAM ABDO BACK WALL COMP: CPT | Mod: 26,,, | Performed by: RADIOLOGY

## 2021-05-22 PROCEDURE — 74018 XR ABDOMEN AP 1 VIEW: ICD-10-PCS | Mod: 26,,, | Performed by: RADIOLOGY

## 2021-05-22 PROCEDURE — 76770 US EXAM ABDO BACK WALL COMP: CPT | Mod: TC

## 2021-05-22 PROCEDURE — 74018 RADEX ABDOMEN 1 VIEW: CPT | Mod: TC,FY

## 2021-06-14 ENCOUNTER — OFFICE VISIT (OUTPATIENT)
Dept: UROLOGY | Facility: CLINIC | Age: 21
End: 2021-06-14
Payer: COMMERCIAL

## 2021-06-14 VITALS
BODY MASS INDEX: 27.11 KG/M2 | SYSTOLIC BLOOD PRESSURE: 134 MMHG | DIASTOLIC BLOOD PRESSURE: 61 MMHG | HEIGHT: 69 IN | HEART RATE: 72 BPM | WEIGHT: 183.06 LBS

## 2021-06-14 DIAGNOSIS — N20.0 NEPHROLITHIASIS: Primary | ICD-10-CM

## 2021-06-14 PROCEDURE — 99999 PR PBB SHADOW E&M-EST. PATIENT-LVL IV: CPT | Mod: PBBFAC,,, | Performed by: UROLOGY

## 2021-06-14 PROCEDURE — 99999 PR PBB SHADOW E&M-EST. PATIENT-LVL IV: ICD-10-PCS | Mod: PBBFAC,,, | Performed by: UROLOGY

## 2021-06-14 PROCEDURE — 99214 OFFICE O/P EST MOD 30 MIN: CPT | Mod: S$GLB,,, | Performed by: UROLOGY

## 2021-06-14 PROCEDURE — 3008F PR BODY MASS INDEX (BMI) DOCUMENTED: ICD-10-PCS | Mod: CPTII,S$GLB,, | Performed by: UROLOGY

## 2021-06-14 PROCEDURE — 99214 PR OFFICE/OUTPT VISIT, EST, LEVL IV, 30-39 MIN: ICD-10-PCS | Mod: S$GLB,,, | Performed by: UROLOGY

## 2021-06-14 PROCEDURE — 3008F BODY MASS INDEX DOCD: CPT | Mod: CPTII,S$GLB,, | Performed by: UROLOGY

## 2021-08-12 ENCOUNTER — TELEPHONE (OUTPATIENT)
Dept: FAMILY MEDICINE | Facility: CLINIC | Age: 21
End: 2021-08-12

## 2021-08-25 ENCOUNTER — OFFICE VISIT (OUTPATIENT)
Dept: FAMILY MEDICINE | Facility: CLINIC | Age: 21
End: 2021-08-25
Payer: COMMERCIAL

## 2021-08-25 ENCOUNTER — LAB VISIT (OUTPATIENT)
Dept: LAB | Facility: HOSPITAL | Age: 21
End: 2021-08-25
Attending: FAMILY MEDICINE
Payer: COMMERCIAL

## 2021-08-25 VITALS
SYSTOLIC BLOOD PRESSURE: 116 MMHG | DIASTOLIC BLOOD PRESSURE: 64 MMHG | BODY MASS INDEX: 27.95 KG/M2 | OXYGEN SATURATION: 96 % | HEART RATE: 78 BPM | HEIGHT: 69 IN | TEMPERATURE: 98 F | WEIGHT: 188.69 LBS

## 2021-08-25 DIAGNOSIS — Z00.00 WELLNESS EXAMINATION: Primary | ICD-10-CM

## 2021-08-25 DIAGNOSIS — Z11.59 NEED FOR HEPATITIS C SCREENING TEST: ICD-10-CM

## 2021-08-25 DIAGNOSIS — Z13.220 ENCOUNTER FOR LIPID SCREENING FOR CARDIOVASCULAR DISEASE: ICD-10-CM

## 2021-08-25 DIAGNOSIS — Z13.6 ENCOUNTER FOR LIPID SCREENING FOR CARDIOVASCULAR DISEASE: ICD-10-CM

## 2021-08-25 DIAGNOSIS — Z11.4 ENCOUNTER FOR SCREENING FOR HIV: ICD-10-CM

## 2021-08-25 DIAGNOSIS — E21.3 HYPERPARATHYROIDISM: ICD-10-CM

## 2021-08-25 DIAGNOSIS — Z13.1 SCREENING FOR DIABETES MELLITUS: ICD-10-CM

## 2021-08-25 DIAGNOSIS — Z00.00 WELLNESS EXAMINATION: ICD-10-CM

## 2021-08-25 DIAGNOSIS — Z23 IMMUNIZATION DUE: ICD-10-CM

## 2021-08-25 LAB
ALBUMIN SERPL BCP-MCNC: 4.4 G/DL (ref 3.5–5.2)
ALP SERPL-CCNC: 54 U/L (ref 55–135)
ALT SERPL W/O P-5'-P-CCNC: 42 U/L (ref 10–44)
ANION GAP SERPL CALC-SCNC: 7 MMOL/L (ref 8–16)
AST SERPL-CCNC: 19 U/L (ref 10–40)
BASOPHILS # BLD AUTO: 0.07 K/UL (ref 0–0.2)
BASOPHILS NFR BLD: 1 % (ref 0–1.9)
BILIRUB SERPL-MCNC: 0.6 MG/DL (ref 0.1–1)
BUN SERPL-MCNC: 12 MG/DL (ref 6–20)
CALCIUM SERPL-MCNC: 9.9 MG/DL (ref 8.7–10.5)
CHLORIDE SERPL-SCNC: 106 MMOL/L (ref 95–110)
CHOLEST SERPL-MCNC: 256 MG/DL (ref 120–199)
CHOLEST/HDLC SERPL: 6.2 {RATIO} (ref 2–5)
CO2 SERPL-SCNC: 25 MMOL/L (ref 23–29)
CREAT SERPL-MCNC: 0.8 MG/DL (ref 0.5–1.4)
DIFFERENTIAL METHOD: NORMAL
EOSINOPHIL # BLD AUTO: 0.2 K/UL (ref 0–0.5)
EOSINOPHIL NFR BLD: 2.6 % (ref 0–8)
ERYTHROCYTE [DISTWIDTH] IN BLOOD BY AUTOMATED COUNT: 13.1 % (ref 11.5–14.5)
EST. GFR  (AFRICAN AMERICAN): >60 ML/MIN/1.73 M^2
EST. GFR  (NON AFRICAN AMERICAN): >60 ML/MIN/1.73 M^2
ESTIMATED AVG GLUCOSE: 108 MG/DL (ref 68–131)
GLUCOSE SERPL-MCNC: 85 MG/DL (ref 70–110)
HBA1C MFR BLD: 5.4 % (ref 4–5.6)
HCT VFR BLD AUTO: 44.5 % (ref 40–54)
HDLC SERPL-MCNC: 41 MG/DL (ref 40–75)
HDLC SERPL: 16 % (ref 20–50)
HGB BLD-MCNC: 14.6 G/DL (ref 14–18)
IMM GRANULOCYTES # BLD AUTO: 0.02 K/UL (ref 0–0.04)
IMM GRANULOCYTES NFR BLD AUTO: 0.3 % (ref 0–0.5)
LDLC SERPL CALC-MCNC: 176.8 MG/DL (ref 63–159)
LYMPHOCYTES # BLD AUTO: 2.5 K/UL (ref 1–4.8)
LYMPHOCYTES NFR BLD: 35.4 % (ref 18–48)
MCH RBC QN AUTO: 27.8 PG (ref 27–31)
MCHC RBC AUTO-ENTMCNC: 32.8 G/DL (ref 32–36)
MCV RBC AUTO: 85 FL (ref 82–98)
MONOCYTES # BLD AUTO: 0.6 K/UL (ref 0.3–1)
MONOCYTES NFR BLD: 9 % (ref 4–15)
NEUTROPHILS # BLD AUTO: 3.6 K/UL (ref 1.8–7.7)
NEUTROPHILS NFR BLD: 51.7 % (ref 38–73)
NONHDLC SERPL-MCNC: 215 MG/DL
NRBC BLD-RTO: 0 /100 WBC
PLATELET # BLD AUTO: 245 K/UL (ref 150–450)
PMV BLD AUTO: 10.1 FL (ref 9.2–12.9)
POTASSIUM SERPL-SCNC: 4 MMOL/L (ref 3.5–5.1)
PROT SERPL-MCNC: 7.4 G/DL (ref 6–8.4)
PTH-INTACT SERPL-MCNC: 45.5 PG/ML (ref 9–77)
RBC # BLD AUTO: 5.25 M/UL (ref 4.6–6.2)
SODIUM SERPL-SCNC: 138 MMOL/L (ref 136–145)
TRIGL SERPL-MCNC: 191 MG/DL (ref 30–150)
WBC # BLD AUTO: 7.01 K/UL (ref 3.9–12.7)

## 2021-08-25 PROCEDURE — 90732 PPSV23 VACC 2 YRS+ SUBQ/IM: CPT | Mod: S$GLB,,, | Performed by: FAMILY MEDICINE

## 2021-08-25 PROCEDURE — 87389 HIV-1 AG W/HIV-1&-2 AB AG IA: CPT | Performed by: FAMILY MEDICINE

## 2021-08-25 PROCEDURE — 86803 HEPATITIS C AB TEST: CPT | Performed by: FAMILY MEDICINE

## 2021-08-25 PROCEDURE — 1126F AMNT PAIN NOTED NONE PRSNT: CPT | Mod: CPTII,S$GLB,, | Performed by: FAMILY MEDICINE

## 2021-08-25 PROCEDURE — 3074F SYST BP LT 130 MM HG: CPT | Mod: CPTII,S$GLB,, | Performed by: FAMILY MEDICINE

## 2021-08-25 PROCEDURE — 3008F BODY MASS INDEX DOCD: CPT | Mod: CPTII,S$GLB,, | Performed by: FAMILY MEDICINE

## 2021-08-25 PROCEDURE — 99999 PR PBB SHADOW E&M-EST. PATIENT-LVL III: CPT | Mod: PBBFAC,,, | Performed by: FAMILY MEDICINE

## 2021-08-25 PROCEDURE — 3044F HG A1C LEVEL LT 7.0%: CPT | Mod: CPTII,S$GLB,, | Performed by: FAMILY MEDICINE

## 2021-08-25 PROCEDURE — 83036 HEMOGLOBIN GLYCOSYLATED A1C: CPT | Performed by: FAMILY MEDICINE

## 2021-08-25 PROCEDURE — 3008F PR BODY MASS INDEX (BMI) DOCUMENTED: ICD-10-PCS | Mod: CPTII,S$GLB,, | Performed by: FAMILY MEDICINE

## 2021-08-25 PROCEDURE — 36415 COLL VENOUS BLD VENIPUNCTURE: CPT | Mod: PO | Performed by: FAMILY MEDICINE

## 2021-08-25 PROCEDURE — 90715 TDAP VACCINE 7 YRS/> IM: CPT | Mod: S$GLB,,, | Performed by: FAMILY MEDICINE

## 2021-08-25 PROCEDURE — 99999 PR PBB SHADOW E&M-EST. PATIENT-LVL III: ICD-10-PCS | Mod: PBBFAC,,, | Performed by: FAMILY MEDICINE

## 2021-08-25 PROCEDURE — 3044F PR MOST RECENT HEMOGLOBIN A1C LEVEL <7.0%: ICD-10-PCS | Mod: CPTII,S$GLB,, | Performed by: FAMILY MEDICINE

## 2021-08-25 PROCEDURE — 3078F DIAST BP <80 MM HG: CPT | Mod: CPTII,S$GLB,, | Performed by: FAMILY MEDICINE

## 2021-08-25 PROCEDURE — 90472 IMMUNIZATION ADMIN EACH ADD: CPT | Mod: S$GLB,,, | Performed by: FAMILY MEDICINE

## 2021-08-25 PROCEDURE — 83970 ASSAY OF PARATHORMONE: CPT | Performed by: FAMILY MEDICINE

## 2021-08-25 PROCEDURE — 85025 COMPLETE CBC W/AUTO DIFF WBC: CPT | Performed by: FAMILY MEDICINE

## 2021-08-25 PROCEDURE — 99395 PREV VISIT EST AGE 18-39: CPT | Mod: 25,S$GLB,, | Performed by: FAMILY MEDICINE

## 2021-08-25 PROCEDURE — 80053 COMPREHEN METABOLIC PANEL: CPT | Performed by: FAMILY MEDICINE

## 2021-08-25 PROCEDURE — 90471 PNEUMOCOCCAL POLYSACCHARIDE VACCINE 23-VALENT =>2YO SQ IM: ICD-10-PCS | Mod: S$GLB,,, | Performed by: FAMILY MEDICINE

## 2021-08-25 PROCEDURE — 1126F PR PAIN SEVERITY QUANTIFIED, NO PAIN PRESENT: ICD-10-PCS | Mod: CPTII,S$GLB,, | Performed by: FAMILY MEDICINE

## 2021-08-25 PROCEDURE — 80061 LIPID PANEL: CPT | Performed by: FAMILY MEDICINE

## 2021-08-25 PROCEDURE — 90472 TDAP VACCINE GREATER THAN OR EQUAL TO 7YO IM: ICD-10-PCS | Mod: S$GLB,,, | Performed by: FAMILY MEDICINE

## 2021-08-25 PROCEDURE — 3074F PR MOST RECENT SYSTOLIC BLOOD PRESSURE < 130 MM HG: ICD-10-PCS | Mod: CPTII,S$GLB,, | Performed by: FAMILY MEDICINE

## 2021-08-25 PROCEDURE — 1159F MED LIST DOCD IN RCRD: CPT | Mod: CPTII,S$GLB,, | Performed by: FAMILY MEDICINE

## 2021-08-25 PROCEDURE — 3078F PR MOST RECENT DIASTOLIC BLOOD PRESSURE < 80 MM HG: ICD-10-PCS | Mod: CPTII,S$GLB,, | Performed by: FAMILY MEDICINE

## 2021-08-25 PROCEDURE — 1159F PR MEDICATION LIST DOCUMENTED IN MEDICAL RECORD: ICD-10-PCS | Mod: CPTII,S$GLB,, | Performed by: FAMILY MEDICINE

## 2021-08-25 PROCEDURE — 90715 TDAP VACCINE GREATER THAN OR EQUAL TO 7YO IM: ICD-10-PCS | Mod: S$GLB,,, | Performed by: FAMILY MEDICINE

## 2021-08-25 PROCEDURE — 90732 PNEUMOCOCCAL POLYSACCHARIDE VACCINE 23-VALENT =>2YO SQ IM: ICD-10-PCS | Mod: S$GLB,,, | Performed by: FAMILY MEDICINE

## 2021-08-25 PROCEDURE — 99395 PR PREVENTIVE VISIT,EST,18-39: ICD-10-PCS | Mod: 25,S$GLB,, | Performed by: FAMILY MEDICINE

## 2021-08-25 PROCEDURE — 90471 IMMUNIZATION ADMIN: CPT | Mod: S$GLB,,, | Performed by: FAMILY MEDICINE

## 2021-08-26 LAB
HCV AB SERPL QL IA: NEGATIVE
HIV 1+2 AB+HIV1 P24 AG SERPL QL IA: NEGATIVE

## 2021-09-13 ENCOUNTER — PATIENT OUTREACH (OUTPATIENT)
Dept: ADMINISTRATIVE | Facility: OTHER | Age: 21
End: 2021-09-13

## 2021-09-14 ENCOUNTER — TELEPHONE (OUTPATIENT)
Dept: UROLOGY | Facility: CLINIC | Age: 21
End: 2021-09-14

## 2023-04-24 ENCOUNTER — HOSPITAL ENCOUNTER (EMERGENCY)
Facility: HOSPITAL | Age: 23
Discharge: HOME | End: 2023-04-24
Attending: EMERGENCY MEDICINE
Payer: MEDICAID

## 2023-04-24 VITALS
BODY MASS INDEX: 27.28 KG/M2 | SYSTOLIC BLOOD PRESSURE: 132 MMHG | HEIGHT: 68 IN | RESPIRATION RATE: 16 BRPM | HEART RATE: 79 BPM | DIASTOLIC BLOOD PRESSURE: 81 MMHG | OXYGEN SATURATION: 99 % | TEMPERATURE: 98 F | WEIGHT: 180 LBS

## 2023-04-24 DIAGNOSIS — H16.132: Primary | ICD-10-CM

## 2023-04-24 PROCEDURE — 99283 EMERGENCY DEPT VISIT LOW MDM: CPT

## 2023-04-24 PROCEDURE — 25000003 PHARM REV CODE 250: Performed by: EMERGENCY MEDICINE

## 2023-04-24 RX ORDER — TETRACAINE HYDROCHLORIDE 5 MG/ML
2 SOLUTION OPHTHALMIC
Status: COMPLETED | OUTPATIENT
Start: 2023-04-24 | End: 2023-04-24

## 2023-04-24 RX ORDER — ERYTHROMYCIN 5 MG/G
OINTMENT OPHTHALMIC
Status: COMPLETED | OUTPATIENT
Start: 2023-04-24 | End: 2023-04-24

## 2023-04-24 RX ORDER — ERYTHROMYCIN 5 MG/G
OINTMENT OPHTHALMIC
Qty: 3.5 G | Refills: 0 | Status: SHIPPED | OUTPATIENT
Start: 2023-04-24 | End: 2023-04-26

## 2023-04-24 RX ADMIN — ERYTHROMYCIN 1 INCH: 5 OINTMENT OPHTHALMIC at 05:04

## 2023-04-24 RX ADMIN — TETRACAINE HYDROCHLORIDE 2 DROP: 5 SOLUTION OPHTHALMIC at 05:04

## 2023-04-24 RX ADMIN — FLUORESCEIN SODIUM 1 EACH: 1 STRIP OPHTHALMIC at 05:04

## 2023-04-24 NOTE — ED PROVIDER NOTES
Encounter Date: 4/24/2023       History     Chief Complaint   Patient presents with    Eye Pain     Was welding Thursday, didn't have proper lens in shield and is now having eye pain. 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 but does not recall anything getting in his eye during that time he does not wear contacts      Review of patient's allergies indicates:   Allergen Reactions    Shellfish containing products Nausea And Vomiting    Iodine and iodide containing products      Past Medical History:   Diagnosis Date    Hyperlipidemia     Hyperparathyroidism     Kidney stone      Past Surgical History:   Procedure Laterality Date    CYSTOSCOPY WITH CALCULUS EXTRACTION Right 9/25/2019    Procedure: CYSTOSCOPY, WITH CALCULUS REMOVAL;  Surgeon: Kayla Abbott MD;  Location: [facility] OR;  Service: Urology;  Laterality: Right;    CYSTOSCOPY WITH URETEROSCOPY, RETROGRADE PYELOGRAPHY, AND INSERTION OF STENT Right 6/13/2018    Procedure: CYSTOSCOPY, WITH RETROGRADE PYELOGRAM AND URETERAL STENT INSERTION;  Surgeon: Kayla Abbott MD;  Location: [facility] OR;  Service: Urology;  Laterality: Right;    CYSTOURETEROSCOPY WITH RETROGRADE PYELOGRAPHY AND INSERTION OF STENT INTO URETER Right 9/11/2019    Procedure: CYSTOURETEROSCOPY, WITH RETROGRADE PYELOGRAM AND URETERAL STENT INSERTION;  Surgeon: Kayla Abbott MD;  Location: [facility] OR;  Service: Urology;  Laterality: Right;    CYSTOURETEROSCOPY WITH RETROGRADE PYELOGRAPHY AND INSERTION OF STENT INTO URETER Right 9/25/2019    Procedure: CYSTOURETEROSCOPY, WITH RETROGRADE PYELOGRAM AND URETERAL STENT INSERTION [2531];   Surgeon: Kayla Abbott MD;  Location: [facility] OR;  Service: Urology;  Laterality: Right;  flexible urs    head stiches  2009    2006 left pinky stiches    LASER LITHOTRIPSY Right 6/13/2018    Procedure: LITHOTRIPSY, USING LASER;  Surgeon: Kayla Abbott MD;  Location: [facility] OR;  Service: Urology;  Laterality: Right;    LASER LITHOTRIPSY Right 9/11/2019    Procedure: LITHOTRIPSY, USING LASER;  Surgeon: Kayla Abbott MD;  Location: [facility] OR;  Service: Urology;  Laterality: Right;    PARATHYROIDECTOMY N/A 12/7/2018    Procedure: PARATHYROIDECTOMY;  Surgeon: Lakeisha Patricio MD;  Location: [facility] OR;  Service: General;  Laterality: N/A;  NIMS & Intraop PTH monitoring     TONSILLECTOMY      TYMPANOSTOMY TUBE PLACEMENT      URETEROSCOPIC REMOVAL OF URETERIC CALCULUS Right 6/13/2018    Procedure: REMOVAL, CALCULUS, URETER, URETEROSCOPIC;  Surgeon: Kayla Abbott MD;  Location: [facility] OR;  Service: Urology;  Laterality: Right;    URETEROSCOPIC REMOVAL OF URETERIC CALCULUS  9/11/2019    Procedure: REMOVAL, CALCULUS, URETER, URETEROSCOPIC;  Surgeon: Kayla Abbott MD;  Location: [facility] OR;  Service: Urology;;     Family History   Problem Relation Age of Onset    Stroke Maternal Aunt     Heart disease Maternal Grandmother     Diabetes Maternal Grandmother     Hypertension Maternal Grandmother     Cancer Maternal Grandmother     Lupus Maternal Grandfather     Heart disease Maternal Grandfather     Hypertension Maternal Grandfather     Heart disease Paternal Grandmother     Hypertension Paternal Grandmother     Cancer Paternal Grandmother     Heart disease Paternal Grandfather     Hypertension Paternal Grandfather     Cancer Paternal Grandfather     Psoriasis Neg Hx     Eczema Neg Hx      Social History     Tobacco Use    Smoking status: Every Day     Packs/day: 0.50     Types: Cigarettes    Smokeless tobacco: Never   Substance Use Topics    Alcohol use: No    Drug use: No     Review  of Systems   Eyes:  Positive for photophobia, pain and redness. Negative for discharge and visual disturbance.   All other systems reviewed and are negative.    Physical Exam     Initial Vitals [04/24/23 0439]   BP Pulse Resp Temp SpO2   132/81 79 16 97.6 °F (36.4 °C) 99 %      MAP       --         Physical Exam    Constitutional: He appears well-developed and well-nourished. No distress.   HENT:   Head: Normocephalic and atraumatic.   Right Ear: External ear normal.   Left Ear: External ear normal.   Eyes: Conjunctivae, EOM and lids are normal. Pupils are equal, round, and reactive to light.   Slit lamp exam:       The right eye shows no corneal abrasion.        The left eye shows fluorescein uptake. The left eye shows no corneal abrasion, no corneal flare, no corneal ulcer, no foreign body, no hyphema and no hypopyon.         ED Course   Procedures  Labs Reviewed - No data to display       Imaging Results    None          Medications   TETRAcaine HCl (PF) 0.5 % Drop 2 drop (2 drops Both Eyes Given 4/24/23 0500)   fluorescein ophthalmic strip 1 each (1 each Right Eye Given 4/24/23 0500)   erythromycin 5 mg/gram (0.5 %) ophthalmic ointment (1 inch Left Eye Given 4/24/23 0509)     Medical Decision Making:   ED Management:  Patient's eye exam shows fluorescein uptake consistent with UV keratitis will treat with erythromycin ophthalmic ointment recommend outpatient follow-up with Ophthalmology to certify resolution of keratitis                        Clinical Impression:   Final diagnoses:  [H16.132] UV keratitis, left (Primary)               Carlos Toscano MD  04/24/23 2674

## 2023-04-25 NOTE — TELEPHONE ENCOUNTER
Called mom with detailed pertaining to the upcoming appts: pre-op, pre-admit, and post-op. Questions answered. Appt slip mailed.    no

## 2024-12-03 NOTE — TELEPHONE ENCOUNTER
Spoke with Migue. The stone risk profile has to be cancelled due to the collection being in the wrong container.    hide

## (undated) DEVICE — Device

## (undated) DEVICE — GUIDE WIRE MOTION .035 X 150CM

## (undated) DEVICE — WIRE GD LUB STD 3CM .035 150CM

## (undated) DEVICE — FIBER LASER HOLMIUM 365 MICRON

## (undated) DEVICE — BASKET N-GAGE 2.2 FR/115CM

## (undated) DEVICE — CLIP HEMO TITANIUM MED

## (undated) DEVICE — GAUZE SPONGE BULKEE 6X6.75IN

## (undated) DEVICE — SPONGE KITTNER 1/4X 5/8 L STRL

## (undated) DEVICE — GLOVE SURG ULTRA TOUCH 6

## (undated) DEVICE — SLEEVE SCD EXPRESS CALF MEDIUM

## (undated) DEVICE — SYR ONLY LUER LOCK 20CC

## (undated) DEVICE — GLOVE SURGEONS ULTRA TOUCH 6.5

## (undated) DEVICE — SEE MEDLINE ITEM 152487

## (undated) DEVICE — SUT 4-0 12-18IN SILK BLACK

## (undated) DEVICE — EXTRACTOR STONE 4WR NIT 2.2F 1

## (undated) DEVICE — ELECTRODE REM PLYHSV RETURN 9

## (undated) DEVICE — SCRUB HIBICLENS 4% CHG 4OZ

## (undated) DEVICE — CONTAINER SPECIMEN STRL 4OZ

## (undated) DEVICE — SOL WATER STRL IRR 1000ML

## (undated) DEVICE — LUBRICANT SURGILUBE 2 OZ

## (undated) DEVICE — TRAY DRY SKIN SCRUB PREP

## (undated) DEVICE — SEE MEDLINE ITEM 157185

## (undated) DEVICE — CATH URETERAL RUTNER 5 FR

## (undated) DEVICE — FIBER LASER HOLMIUM 273 MICRON

## (undated) DEVICE — SET IRR URLGY 2LINE UNIV SPIKE

## (undated) DEVICE — SEE MEDLINE ITEM 157166

## (undated) DEVICE — SPONGE GAUZE 16PLY 4X4

## (undated) DEVICE — UNDERGLOVE BIOGEL PI SZ 6.5 LF

## (undated) DEVICE — SYR 10CC LUER LOCK

## (undated) DEVICE — SUT 3-0 12-18IN SILK

## (undated) DEVICE — WARMER DRAPE STERILE LF

## (undated) DEVICE — PROBE SIMULATOR KRAFF

## (undated) DEVICE — NDL HYPO REG 25G X 1 1/2

## (undated) DEVICE — SEE MEDLINE ITEM 157116

## (undated) DEVICE — CLIP HEMO TITANIUM SM 10/CQ

## (undated) DEVICE — SOL IRR NACL .9% 3000ML

## (undated) DEVICE — ADHESIVE MASTISOL VIAL 48/BX

## (undated) DEVICE — SEE MEDLINE ITEM 152622

## (undated) DEVICE — SEE MEDLINE ITEM 157194

## (undated) DEVICE — SYS CLSR DERMABOND PRINEO 22CM

## (undated) DEVICE — MARKER SKIN STND TIP BLUE BARR

## (undated) DEVICE — DRESSING TELFA STRL 4X3 LF

## (undated) DEVICE — SHEATH URET FLEXOR 12FR 45CM

## (undated) DEVICE — SPONGE SUPER KERLIX 6X6.75IN

## (undated) DEVICE — CATH URTRL OPEN END STR TIP 5F

## (undated) DEVICE — DRAPE STERI INSTRUMENT 1018

## (undated) DEVICE — ELECTRODE BLADE INSULATED 1 IN

## (undated) DEVICE — BLADE SURG STAINLESS STEEL #15

## (undated) DEVICE — ADHESIVE DERMABOND ADVANCED

## (undated) DEVICE — CABLE LASER FIBER 365 HOLMIUM

## (undated) DEVICE — GLOVE BIOGEL ORTHOPEDIC 7

## (undated) DEVICE — PACK UNIVERSAL SPLIT II

## (undated) DEVICE — DRESSING TRANS 2X2 TEGADERM

## (undated) DEVICE — SUT VICRYL PLUS 3-0 SH 18IN

## (undated) DEVICE — SEE MEDLINE ITEM 152532

## (undated) DEVICE — CORD BIPOLAR 12 FOOT

## (undated) DEVICE — GUIDEWIRE PTFE .038INX145CM